# Patient Record
Sex: FEMALE | Race: WHITE | Employment: FULL TIME | URBAN - METROPOLITAN AREA
[De-identification: names, ages, dates, MRNs, and addresses within clinical notes are randomized per-mention and may not be internally consistent; named-entity substitution may affect disease eponyms.]

---

## 2017-07-11 ENCOUNTER — HOSPITAL ENCOUNTER (OUTPATIENT)
Dept: MAMMOGRAPHY | Age: 57
Discharge: HOME | End: 2017-07-11
Attending: INTERNAL MEDICINE
Payer: COMMERCIAL

## 2017-07-11 DIAGNOSIS — Z12.31 VISIT FOR SCREENING MAMMOGRAM: ICD-10-CM

## 2017-07-11 DIAGNOSIS — D47.2 MONOCLONAL GAMMOPATHY: ICD-10-CM

## 2017-07-11 DIAGNOSIS — C90.00 MULTIPLE MYELOMA NOT HAVING ACHIEVED REMISSION (HCC): ICD-10-CM

## 2017-07-11 PROCEDURE — 77067 SCR MAMMO BI INCL CAD: CPT

## 2017-07-11 PROCEDURE — 77080 DXA BONE DENSITY AXIAL: CPT

## 2017-08-14 ENCOUNTER — TELEPHONE (OUTPATIENT)
Dept: SURGERY | Age: 57
End: 2017-08-14

## 2017-08-14 DIAGNOSIS — K21.9 GASTROESOPHAGEAL REFLUX DISEASE, ESOPHAGITIS PRESENCE NOT SPECIFIED: Primary | ICD-10-CM

## 2017-08-14 RX ORDER — SUCRALFATE 1 G/1
1 TABLET ORAL 4 TIMES DAILY
Qty: 120 TAB | Refills: 1 | Status: SHIPPED | OUTPATIENT
Start: 2017-08-14 | End: 2017-08-14 | Stop reason: SDUPTHER

## 2017-08-14 RX ORDER — SUCRALFATE 1 G/1
1 TABLET ORAL 4 TIMES DAILY
Qty: 120 TAB | Refills: 1 | Status: SHIPPED | OUTPATIENT
Start: 2017-08-14 | End: 2018-11-05

## 2017-08-14 NOTE — TELEPHONE ENCOUNTER
Spoke with pharmacists @ [facility] regarding prescription keep printing. she states,usually Carafate does not  Have to be phoned in. Per Graeme Peace NP Carafate was phone into patient's pharmacy on file.

## 2018-03-01 ENCOUNTER — HOSPITAL ENCOUNTER (EMERGENCY)
Age: 58
Discharge: HOME | End: 2018-03-01
Attending: EMERGENCY MEDICINE
Payer: COMMERCIAL

## 2018-03-01 ENCOUNTER — APPOINTMENT (OUTPATIENT)
Dept: GENERAL RADIOLOGY | Age: 58
End: 2018-03-01
Attending: EMERGENCY MEDICINE
Payer: COMMERCIAL

## 2018-03-01 ENCOUNTER — APPOINTMENT (OUTPATIENT)
Dept: CT IMAGING | Age: 58
End: 2018-03-01
Attending: EMERGENCY MEDICINE
Payer: COMMERCIAL

## 2018-03-01 VITALS
DIASTOLIC BLOOD PRESSURE: 70 MMHG | SYSTOLIC BLOOD PRESSURE: 130 MMHG | BODY MASS INDEX: 22.25 KG/M2 | OXYGEN SATURATION: 100 % | WEIGHT: 138.45 LBS | RESPIRATION RATE: 20 BRPM | TEMPERATURE: 98.5 F | HEART RATE: 111 BPM | HEIGHT: 66 IN

## 2018-03-01 DIAGNOSIS — E86.0 DEHYDRATION: ICD-10-CM

## 2018-03-01 DIAGNOSIS — K52.9 GASTROENTERITIS, ACUTE: Primary | ICD-10-CM

## 2018-03-01 LAB
ALBUMIN SERPL-MCNC: 3.6 G/DL (ref 3.5–5)
ALBUMIN/GLOB SERPL: 1.1 {RATIO} (ref 1.1–2.2)
ALP SERPL-CCNC: 42 U/L (ref 45–117)
ALT SERPL-CCNC: 12 U/L (ref 12–78)
ANION GAP SERPL CALC-SCNC: 8 MMOL/L (ref 5–15)
APPEARANCE UR: CLEAR
AST SERPL-CCNC: 15 U/L (ref 15–37)
BACTERIA URNS QL MICRO: NEGATIVE /HPF
BASOPHILS # BLD: 0 K/UL (ref 0–0.1)
BASOPHILS NFR BLD: 0 % (ref 0–1)
BILIRUB SERPL-MCNC: 0.5 MG/DL (ref 0.2–1)
BILIRUB UR QL: NEGATIVE
BUN SERPL-MCNC: 30 MG/DL (ref 6–20)
BUN/CREAT SERPL: 21 (ref 12–20)
CALCIUM SERPL-MCNC: 7.7 MG/DL (ref 8.5–10.1)
CHLORIDE SERPL-SCNC: 116 MMOL/L (ref 97–108)
CO2 SERPL-SCNC: 19 MMOL/L (ref 21–32)
COLOR UR: ABNORMAL
CREAT SERPL-MCNC: 1.44 MG/DL (ref 0.55–1.02)
DIFFERENTIAL METHOD BLD: ABNORMAL
EOSINOPHIL # BLD: 0 K/UL (ref 0–0.4)
EOSINOPHIL NFR BLD: 1 % (ref 0–7)
EPITH CASTS URNS QL MICRO: ABNORMAL /LPF
ERYTHROCYTE [DISTWIDTH] IN BLOOD BY AUTOMATED COUNT: 14.5 % (ref 11.5–14.5)
GLOBULIN SER CALC-MCNC: 3.3 G/DL (ref 2–4)
GLUCOSE SERPL-MCNC: 94 MG/DL (ref 65–100)
GLUCOSE UR STRIP.AUTO-MCNC: NEGATIVE MG/DL
HCT VFR BLD AUTO: 39.2 % (ref 35–47)
HGB BLD-MCNC: 12.9 G/DL (ref 11.5–16)
HGB UR QL STRIP: ABNORMAL
HYALINE CASTS URNS QL MICRO: ABNORMAL /LPF (ref 0–5)
IMM GRANULOCYTES # BLD: 0 K/UL (ref 0–0.04)
IMM GRANULOCYTES NFR BLD AUTO: 0 % (ref 0–0.5)
KETONES UR QL STRIP.AUTO: ABNORMAL MG/DL
LACTATE SERPL-SCNC: 0.6 MMOL/L (ref 0.4–2)
LEUKOCYTE ESTERASE UR QL STRIP.AUTO: NEGATIVE
LIPASE SERPL-CCNC: 124 U/L (ref 73–393)
LYMPHOCYTES # BLD: 0.6 K/UL (ref 0.8–3.5)
LYMPHOCYTES NFR BLD: 18 % (ref 12–49)
MCH RBC QN AUTO: 30.4 PG (ref 26–34)
MCHC RBC AUTO-ENTMCNC: 32.9 G/DL (ref 30–36.5)
MCV RBC AUTO: 92.2 FL (ref 80–99)
MONOCYTES # BLD: 0.4 K/UL (ref 0–1)
MONOCYTES NFR BLD: 12 % (ref 5–13)
NEUTS SEG # BLD: 2.5 K/UL (ref 1.8–8)
NEUTS SEG NFR BLD: 69 % (ref 32–75)
NITRITE UR QL STRIP.AUTO: NEGATIVE
NRBC # BLD: 0 K/UL (ref 0–0.01)
NRBC BLD-RTO: 0 PER 100 WBC
PH UR STRIP: 5.5 [PH] (ref 5–8)
PLATELET # BLD AUTO: 102 K/UL (ref 150–400)
PMV BLD AUTO: 8.7 FL (ref 8.9–12.9)
POTASSIUM SERPL-SCNC: 3.6 MMOL/L (ref 3.5–5.1)
PROT SERPL-MCNC: 6.9 G/DL (ref 6.4–8.2)
PROT UR STRIP-MCNC: NEGATIVE MG/DL
RBC # BLD AUTO: 4.25 M/UL (ref 3.8–5.2)
RBC #/AREA URNS HPF: ABNORMAL /HPF (ref 0–5)
RBC MORPH BLD: ABNORMAL
SODIUM SERPL-SCNC: 143 MMOL/L (ref 136–145)
SP GR UR REFRACTOMETRY: 1.01 (ref 1–1.03)
UROBILINOGEN UR QL STRIP.AUTO: 0.2 EU/DL (ref 0.2–1)
WBC # BLD AUTO: 3.5 K/UL (ref 3.6–11)
WBC URNS QL MICRO: ABNORMAL /HPF (ref 0–4)

## 2018-03-01 PROCEDURE — 74176 CT ABD & PELVIS W/O CONTRAST: CPT

## 2018-03-01 PROCEDURE — 99284 EMERGENCY DEPT VISIT MOD MDM: CPT

## 2018-03-01 PROCEDURE — 87040 BLOOD CULTURE FOR BACTERIA: CPT | Performed by: EMERGENCY MEDICINE

## 2018-03-01 PROCEDURE — 83605 ASSAY OF LACTIC ACID: CPT | Performed by: EMERGENCY MEDICINE

## 2018-03-01 PROCEDURE — 83690 ASSAY OF LIPASE: CPT | Performed by: EMERGENCY MEDICINE

## 2018-03-01 PROCEDURE — 96361 HYDRATE IV INFUSION ADD-ON: CPT

## 2018-03-01 PROCEDURE — 85025 COMPLETE CBC W/AUTO DIFF WBC: CPT | Performed by: EMERGENCY MEDICINE

## 2018-03-01 PROCEDURE — 71045 X-RAY EXAM CHEST 1 VIEW: CPT

## 2018-03-01 PROCEDURE — 36415 COLL VENOUS BLD VENIPUNCTURE: CPT | Performed by: EMERGENCY MEDICINE

## 2018-03-01 PROCEDURE — 80053 COMPREHEN METABOLIC PANEL: CPT | Performed by: EMERGENCY MEDICINE

## 2018-03-01 PROCEDURE — 74011250636 HC RX REV CODE- 250/636: Performed by: EMERGENCY MEDICINE

## 2018-03-01 PROCEDURE — 81001 URINALYSIS AUTO W/SCOPE: CPT | Performed by: EMERGENCY MEDICINE

## 2018-03-01 PROCEDURE — 96374 THER/PROPH/DIAG INJ IV PUSH: CPT

## 2018-03-01 RX ORDER — SODIUM CHLORIDE 0.9 % (FLUSH) 0.9 %
5-10 SYRINGE (ML) INJECTION AS NEEDED
Status: DISCONTINUED | OUTPATIENT
Start: 2018-03-01 | End: 2018-03-01 | Stop reason: HOSPADM

## 2018-03-01 RX ORDER — HYDROCODONE BITARTRATE AND ACETAMINOPHEN 5; 325 MG/1; MG/1
2 TABLET ORAL
Qty: 10 TAB | Refills: 0 | Status: SHIPPED | OUTPATIENT
Start: 2018-03-01 | End: 2018-11-05

## 2018-03-01 RX ORDER — ONDANSETRON 4 MG/1
4 TABLET, ORALLY DISINTEGRATING ORAL
Qty: 20 TAB | Refills: 0 | Status: SHIPPED | OUTPATIENT
Start: 2018-03-01 | End: 2018-11-05

## 2018-03-01 RX ORDER — ONDANSETRON 2 MG/ML
8 INJECTION INTRAMUSCULAR; INTRAVENOUS
Status: COMPLETED | OUTPATIENT
Start: 2018-03-01 | End: 2018-03-01

## 2018-03-01 RX ADMIN — SODIUM CHLORIDE 1884 ML: 900 INJECTION, SOLUTION INTRAVENOUS at 15:27

## 2018-03-01 RX ADMIN — ONDANSETRON 8 MG: 2 INJECTION INTRAMUSCULAR; INTRAVENOUS at 15:27

## 2018-03-01 NOTE — DISCHARGE INSTRUCTIONS
Dehydration: Care Instructions  Your Care Instructions  Dehydration happens when your body loses too much fluid. This might happen when you do not drink enough water or you lose large amounts of fluids from your body because of diarrhea, vomiting, or sweating. Severe dehydration can be life-threatening. Water and minerals called electrolytes help put your body fluids back in balance. Learn the early signs of fluid loss, and drink more fluids to prevent dehydration. Follow-up care is a key part of your treatment and safety. Be sure to make and go to all appointments, and call your doctor if you are having problems. It's also a good idea to know your test results and keep a list of the medicines you take. How can you care for yourself at home? · To prevent dehydration, drink plenty of fluids, enough so that your urine is light yellow or clear like water. Choose water and other caffeine-free clear liquids until you feel better. If you have kidney, heart, or liver disease and have to limit fluids, talk with your doctor before you increase the amount of fluids you drink. · If you do not feel like eating or drinking, try taking small sips of water, sports drinks, or other rehydration drinks. · Get plenty of rest.  To prevent dehydration  · Add more fluids to your diet and daily routine, unless your doctor has told you not to. · During hot weather, drink more fluids. Drink even more fluids if you exercise a lot. Stay away from drinks with alcohol or caffeine. · Watch for the symptoms of dehydration. These include:  ¨ A dry, sticky mouth. ¨ Dark yellow urine, and not much of it. ¨ Dry and sunken eyes. ¨ Feeling very tired. · Learn what problems can lead to dehydration. These include:  ¨ Diarrhea, fever, and vomiting. ¨ Any illness with a fever, such as pneumonia or the flu. ¨ Activities that cause heavy sweating, such as endurance races and heavy outdoor work in hot or humid weather.   ¨ Alcohol or drug abuse or withdrawal.  ¨ Certain medicines, such as cold and allergy pills (antihistamines), diet pills (diuretics), and laxatives. ¨ Certain diseases, such as diabetes, cancer, and heart or kidney disease. When should you call for help? Call 911 anytime you think you may need emergency care. For example, call if:  ? · You passed out (lost consciousness). ?Call your doctor now or seek immediate medical care if:  ? · You are confused and cannot think clearly. ? · You are dizzy or lightheaded, or you feel like you may faint. ? · You have signs of needing more fluids. You have sunken eyes and a dry mouth, and you pass only a little dark urine. ? · You cannot keep fluids down. ? Watch closely for changes in your health, and be sure to contact your doctor if:  ? · You are not making tears. ? · Your skin is very dry and sags slowly back into place after you pinch it. ? · Your mouth and eyes are very dry. Where can you learn more? Go to http://susan-edis.info/. Enter R780 in the search box to learn more about \"Dehydration: Care Instructions. \"  Current as of: March 20, 2017  Content Version: 11.4  © 4736-2957 [employer]. Care instructions adapted under license by [employer] (which disclaims liability or warranty for this information). If you have questions about a medical condition or this instruction, always ask your healthcare professional. [place] [address] any warranty or liability for your use of this information.

## 2018-03-01 NOTE — ED PROVIDER NOTES
EMERGENCY DEPARTMENT HISTORY AND PHYSICAL EXAM      Date: 3/1/2018  Patient Name: Torri Howard    History of Presenting Illness     Chief Complaint   Patient presents with    Abdominal Pain     lower abd pain with n/v/d and low grade fever onset monday       History Provided By: Patient    HPI: Torri Howard, 59 y.o. female with PMHx significant for CAD, CKD, gastric bypass, hysterectomy, HTN, and cancer, presents ambulatory to the ED with cc of moderate, and cramping lower/left sided abdominal pain, along with associated N/V/D, and 101F fever x 3 days. Pt describes having watery diarrhea. She reports being a , and being around a sick child who went home for diarrhea on 02/23/2018. She denies being on recent antibiotics, and any recent hospital stays. Social Hx:  ETOH: no  Tobacco: no  Illicit drug use: no       PCP: Delphine Schmitz MD    There are no other complaints, changes, or physical findings at this time. Current Facility-Administered Medications   Medication Dose Route Frequency Provider Last Rate Last Dose    sodium chloride (NS) flush 5-10 mL  5-10 mL IntraVENous PRN Migdalia Venegas MD         Current Outpatient Prescriptions   Medication Sig Dispense Refill    ondansetron (ZOFRAN ODT) 4 mg disintegrating tablet Take 1 Tab by mouth every eight (8) hours as needed for Nausea. 20 Tab 0    HYDROcodone-acetaminophen (NORCO) 5-325 mg per tablet Take 2 Tabs by mouth every four (4) hours as needed. 10 Tab 0    naloxone 2 mg/actuation spry Use 1 spray intranasally into 1 nostril. Use a new Narcan nasal spray for subsequent doses and administer into alternating nostrils. May repeat every 2 to 3 minutes as needed. 4 Each 0    sucralfate (CARAFATE) 1 gram tablet Take 1 Tab by mouth four (4) times daily. May dissolve in 15 ml of water 120 Tab 1    saccharomyces boulardii (FLORASTOR) 250 mg capsule Take 1 capsule by mouth two (2) times a day.  60 capsule 0    lisinopril-hydrochlorothiazide (PRINZIDE, ZESTORETIC) 20-12.5 mg per tablet Take 1 Tab by mouth daily.  COD LIVER OIL PO Take  by mouth daily.  ascorbic acid (VITAMIN C) 500 mg tablet Take 500 mg by mouth daily.  VAGIFEM 10 mcg Tab vaginal tablet 10 mcg every other day.  LORazepam (ATIVAN) 1 mg tablet Take 1 Tab by mouth nightly as needed. 30 Tab 0    CYANOCOBALAMIN, VITAMIN B-12, (VITAMIN B-12 PO) Take  by mouth.  multivitamins with iron Tab Take 18 mg by mouth two (2) times a day. 18mg of iron 90 Tab 5       Past History     Past Medical History:  Past Medical History:   Diagnosis Date    Cancer ([facility] [address]     multiple Myolomia    Chronic kidney disease     2010-dr tillman    Dyspepsia 4/11/07    Edema of both legs 4/11/07    FH: CAD (coronary artery disease)     pt denies cad    [facility] 2007    [facility]    Gastrointestinal disorder     gastric bypass , and unclers    Headache(784.0) 4/11/07    HTN (hypertension) 4/11/07    Joint pain 4/11/07    Morbid obesity ([address] 4/11/07    Multiple myeloma     dr Bradford Retort- diagnosed 2010    Multiple myeloma, without mention of having achieved remission 11/4/2011    Perforated viscus 7/18/2014    S/P gastric bypass 11/4/2011    Sleep apnea 4/11/07    Stool color black        Past Surgical History:  Past Surgical History:   Procedure Laterality Date    HX GASTRIC BYPASS  7/9/07    GASTRIC BYPASS--MONTOYA    HX GYN  2001    hysterectomy    HX TOTAL ABDOMINAL HYSTERECTOMY  2002       Family History:  Family History   Problem Relation Age of Onset    Hypertension Mother     Diabetes Father     Heart Disease Father     Stroke Father     Breast Cancer Cousin 41     bilateral mastectomy       Social History:  Social History   Substance Use Topics    Smoking status: Never Smoker    Smokeless tobacco: Never Used    Alcohol use No       Allergies:   Allergies   Allergen Reactions    Bactrim [Sulfamethoprim Ds] Nausea and Vomiting    Bactrim [Sulfamethoprim] Nausea and Vomiting         Review of Systems   Review of Systems   Constitutional: Positive for fever. Negative for activity change and chills. HENT: Negative for congestion and sore throat. Eyes: Negative for pain and redness. Respiratory: Negative for cough, chest tightness and shortness of breath. Cardiovascular: Negative for chest pain and palpitations. Gastrointestinal: Positive for abdominal pain, diarrhea, nausea and vomiting. Genitourinary: Negative for dysuria, frequency and urgency. Musculoskeletal: Negative for back pain and neck pain. Skin: Negative for rash. Neurological: Negative for syncope, light-headedness and headaches. Psychiatric/Behavioral: Negative for confusion. All other systems reviewed and are negative. Physical Exam   Physical Exam   Constitutional: She is oriented to person, place, and time. She appears well-developed and well-nourished. No distress. HENT:   Head: Normocephalic. Nose: Nose normal.   Mouth/Throat: Oropharynx is clear and moist. No oropharyngeal exudate. Eyes: Conjunctivae are normal. Pupils are equal, round, and reactive to light. No scleral icterus. Neck: Normal range of motion. Neck supple. No JVD present. No tracheal deviation present. No thyromegaly present. Cardiovascular: Normal rate, regular rhythm and intact distal pulses. Exam reveals no gallop and no friction rub. No murmur heard. Pulmonary/Chest: Effort normal and breath sounds normal. No stridor. No respiratory distress. She has no wheezes. She has no rales. Abdominal: Soft. Bowel sounds are normal. She exhibits no distension. There is tenderness (mild) in the left lower quadrant. There is no rebound and no guarding. Musculoskeletal: Normal range of motion. She exhibits no edema. Lymphadenopathy:     She has no cervical adenopathy.    Neurological: She is alert and oriented to person, place, and time. No cranial nerve deficit. She exhibits normal muscle tone. Coordination normal.   Skin: Skin is warm and dry. No rash noted. She is not diaphoretic. No erythema. Psychiatric: She has a normal mood and affect. Her behavior is normal.   Nursing note and vitals reviewed. Diagnostic Study Results     Labs -     Recent Results (from the past 12 hour(s))   CBC WITH AUTOMATED DIFF    Collection Time: 03/01/18  3:13 PM   Result Value Ref Range    WBC 3.5 (L) 3.6 - 11.0 K/uL    RBC 4.25 3.80 - 5.20 M/uL    HGB 12.9 11.5 - 16.0 g/dL    HCT 39.2 35.0 - 47.0 %    MCV 92.2 80.0 - 99.0 FL    MCH 30.4 26.0 - 34.0 PG    MCHC 32.9 30.0 - 36.5 g/dL    RDW 14.5 11.5 - 14.5 %    PLATELET 962 (L) 536 - 400 K/uL    MPV 8.7 (L) 8.9 - 12.9 FL    NRBC 0.0 0  WBC    ABSOLUTE NRBC 0.00 0.00 - 0.01 K/uL    NEUTROPHILS 69 32 - 75 %    LYMPHOCYTES 18 12 - 49 %    MONOCYTES 12 5 - 13 %    EOSINOPHILS 1 0 - 7 %    BASOPHILS 0 0 - 1 %    IMMATURE GRANULOCYTES 0 0.0 - 0.5 %    ABS. NEUTROPHILS 2.5 1.8 - 8.0 K/UL    ABS. LYMPHOCYTES 0.6 (L) 0.8 - 3.5 K/UL    ABS. MONOCYTES 0.4 0.0 - 1.0 K/UL    ABS. EOSINOPHILS 0.0 0.0 - 0.4 K/UL    ABS. BASOPHILS 0.0 0.0 - 0.1 K/UL    ABS. IMM. GRANS. 0.0 0.00 - 0.04 K/UL    DF SMEAR SCANNED      RBC COMMENTS NORMOCYTIC, NORMOCHROMIC     METABOLIC PANEL, COMPREHENSIVE    Collection Time: 03/01/18  3:13 PM   Result Value Ref Range    Sodium 143 136 - 145 mmol/L    Potassium 3.6 3.5 - 5.1 mmol/L    Chloride 116 (H) 97 - 108 mmol/L    CO2 19 (L) 21 - 32 mmol/L    Anion gap 8 5 - 15 mmol/L    Glucose 94 65 - 100 mg/dL    BUN 30 (H) 6 - 20 MG/DL    Creatinine 1.44 (H) 0.55 - 1.02 MG/DL    BUN/Creatinine ratio 21 (H) 12 - 20      GFR est AA 45 (L) >60 ml/min/1.73m2    GFR est non-AA 37 (L) >60 ml/min/1.73m2    Calcium 7.7 (L) 8.5 - 10.1 MG/DL    Bilirubin, total 0.5 0.2 - 1.0 MG/DL    ALT (SGPT) 12 12 - 78 U/L    AST (SGOT) 15 15 - 37 U/L    Alk.  phosphatase 42 (L) 45 - 117 U/L    Protein, total 6.9 6.4 - 8.2 g/dL    Albumin 3.6 3.5 - 5.0 g/dL    Globulin 3.3 2.0 - 4.0 g/dL    A-G Ratio 1.1 1.1 - 2.2     LACTIC ACID    Collection Time: 03/01/18  3:13 PM   Result Value Ref Range    Lactic acid 0.6 0.4 - 2.0 MMOL/L   LIPASE    Collection Time: 03/01/18  3:13 PM   Result Value Ref Range    Lipase 124 73 - 393 U/L   URINALYSIS W/ RFLX MICROSCOPIC    Collection Time: 03/01/18  5:31 PM   Result Value Ref Range    Color YELLOW/STRAW      Appearance CLEAR CLEAR      Specific gravity 1.010 1.003 - 1.030      pH (UA) 5.5 5.0 - 8.0      Protein NEGATIVE  NEG mg/dL    Glucose NEGATIVE  NEG mg/dL    Ketone TRACE (A) NEG mg/dL    Bilirubin NEGATIVE  NEG      Blood SMALL (A) NEG      Urobilinogen 0.2 0.2 - 1.0 EU/dL    Nitrites NEGATIVE  NEG      Leukocyte Esterase NEGATIVE  NEG      WBC 0-4 0 - 4 /hpf    RBC 5-10 0 - 5 /hpf    Epithelial cells FEW FEW /lpf    Bacteria NEGATIVE  NEG /hpf    Hyaline cast 0-2 0 - 5 /lpf       Radiologic Studies -   CT Results  (Last 48 hours)               03/01/18 1709  CT ABD PELV WO CONT Final result    Impression:  IMPRESSION: No evidence of acute diverticulitis or other acute intra-abdominal   process. Incidental findings as above including cholelithiasis. Narrative:  EXAM:  CT ABD PELV WO CONT       INDICATION: Diverticulitis       COMPARISON: CT 7/18/2014. CONTRAST:  None. TECHNIQUE:    Thin axial images were obtained through the abdomen and pelvis. Coronal and   sagittal reconstructions were generated. Oral contrast was not administered. CT   dose reduction was achieved through use of a standardized protocol tailored for   this examination and automatic exposure control for dose modulation. The absence of intravenous contrast material reduces the sensitivity for   evaluation of the solid parenchymal organs of the abdomen. FINDINGS:    LUNG BASES: Clear. INCIDENTALLY IMAGED HEART AND MEDIASTINUM: Unremarkable.    LIVER: No mass or biliary dilatation. GALLBLADDER: Dependent gallstones in the fundus. Otherwise unremarkable. SPLEEN: No mass. PANCREAS: No mass or ductal dilatation. ADRENALS: Stable benign myelolipoma in the left adrenal body. Otherwise   unremarkable. KIDNEYS/URETERS: Bilateral collecting system calculi and cortical calcifications   redemonstrated with no hydronephrosis. Fat-containing lateral interpolar left   renal mass compatible with benign angiomyolipoma is stable. No masses otherwise. Ureters are nondilated. STOMACH: Post surgical changes of gastric bypass appears stable and as expected. SMALL BOWEL: No dilatation or wall thickening. COLON: Noninflamed appearing sigmoid diverticula. No evident wall thickening or   dilation. APPENDIX: Unremarkable. PERITONEUM: No ascites or pneumoperitoneum. RETROPERITONEUM: No lymphadenopathy or aortic aneurysm. REPRODUCTIVE ORGANS: Uterus and ovaries are surgically absent. URINARY BLADDER: No mass or calculus. BONES: Degenerative spine change. Large lucency in the right iliac wing with   central fat density is unchanged. No aggressive lesion or acute fracture. ADDITIONAL COMMENTS: N/A               CXR Results  (Last 48 hours)               03/01/18 1614  XR CHEST PORT Final result    Impression:  IMPRESSION: No acute cardiopulmonary process seen. Narrative:  EXAM:  XR CHEST PORT       INDICATION:  Fever, lower abdominal pain, nausea, vomiting, and diarrhea. Onset   on Monday. Sepsis       COMPARISON:  12/30/2014       FINDINGS: A portable AP radiograph of the chest was obtained at 1559 hours. The   lungs are clear. The cardiac and mediastinal contours and pulmonary vascularity   are remarkable for tortuosity of the descending aorta. The bones and soft   tissues are grossly within normal limits. Medical Decision Making   I am the first provider for this patient.     I reviewed the vital signs, available nursing notes, past medical history, past surgical history, family history and social history. Vital Signs-Reviewed the patient's vital signs. Patient Vitals for the past 12 hrs:   Temp Pulse Resp BP SpO2   03/01/18 1845 - - - 130/70 100 %   03/01/18 1734 - - - 131/89 98 %   03/01/18 1604 - - - - 100 %   03/01/18 1515 - - - 122/82 96 %   03/01/18 1447 98.5 °F (36.9 °C) (!) 111 20 (!) 144/96 97 %       Provider Notes (Medical Decision Making):     DDx: gastroenteritis, dehydration, SBO, diverticulitis. ED Course:   Initial assessment performed. The patients presenting problems have been discussed, and they are in agreement with the care plan formulated and outlined with them. I have encouraged them to ask questions as they arise throughout their visit. ED EKG interpretation: 14:57  Rhythm: normal sinus rhythm; and regular . Rate (approx.): 89; Axis: normal; [place] Interval: normal; QRS interval: normal ; ST/T wave: non-specific changes; This EKG was interpreted by Aidan El MD,ED Provider. 6:47 PM  Pt is feeling much better. She tolerated PO liquids without difficulty. Written by Carletha Kussmaul ED scribe, as dictated by Aidan El MD      Disposition:  DISCHARGE NOTE  7:02 PM  The patient has been re-evaluated and is ready for discharge. Reviewed available results with patient. Counseled pt on diagnosis and care plan. Pt has expressed understanding, and all questions have been answered. Pt agrees with plan and agrees to F/U as recommended, or return to the ED if their sxs worsen. Discharge instructions have been provided and explained to the pt, along with reasons to return to the ED. Pt well appearing afebrile; abd soft; ct abd pelvis without acute process; vss; pt tolerating po prior to discharge; suspect viral gastroenteritis; normal lipase; normal lactate; clear cxr; Aidan El MD      PLAN:  1.    Discharge Medication List as of 3/1/2018  6:52 PM      START taking these medications    Details   ondansetron (ZOFRAN ODT) 4 mg disintegrating tablet Take 1 Tab by mouth every eight (8) hours as needed for Nausea. , Print, Disp-20 Tab, R-0      naloxone 2 mg/actuation spry Use 1 spray intranasally into 1 nostril. Use a new Narcan nasal spray for subsequent doses and administer into alternating nostrils. May repeat every 2 to 3 minutes as needed. , Print, Disp-4 Each, R-0         CONTINUE these medications which have CHANGED    Details   HYDROcodone-acetaminophen (NORCO) 5-325 mg per tablet Take 2 Tabs by mouth every four (4) hours as needed. , Print, Disp-10 Tab, R-0         CONTINUE these medications which have NOT CHANGED    Details   sucralfate (CARAFATE) 1 gram tablet Take 1 Tab by mouth four (4) times daily. May dissolve in 15 ml of water, Print, Disp-120 Tab, R-1      saccharomyces boulardii (FLORASTOR) 250 mg capsule Take 1 capsule by mouth two (2) times a day., Normal, Disp-60 capsule, R-0      lisinopril-hydrochlorothiazide (PRINZIDE, ZESTORETIC) 20-12.5 mg per tablet Take 1 Tab by mouth daily. , Historical Med      COD LIVER OIL PO Take  by mouth daily. Historical Med      ascorbic acid (VITAMIN C) 500 mg tablet Take 500 mg by mouth daily. Historical Med, 500 mg      VAGIFEM 10 mcg Tab vaginal tablet 10 mcg every other day. Historical Med, 10 mcg      LORazepam (ATIVAN) 1 mg tablet Take 1 Tab by mouth nightly as needed. Print, 1 mg, Disp-30 Tab, R-0      CYANOCOBALAMIN, VITAMIN B-12, (VITAMIN B-12 PO) Take  by mouth. Historical Med      multivitamins with iron Tab 18mg of iron18 mg, Oral, 2 TIMES DAILY Starting 9/3/2010, Until Discontinued, Disp-90 Tab, R-5, No Print           2. Follow-up Information     Follow up With Details Comments Contact Info    Meghna Cantu MD Schedule an appointment as soon as possible for a visit in 1 week  [address] [place] [address]  [phone]          Return to ED if worse     Diagnosis      Clinical Impression:   1. Gastroenteritis, acute    2. Dehydration        Attestations: This note is prepared by Nellie Olivares, acting as Scribe for Avinash Gresham MD.      The scribe's documentation has been prepared under my direction and personally reviewed by me in its entirety. I confirm that the note above accurately reflects all work, treatment, procedures, and medical decision making performed by me.   Avinash Gresham MD

## 2018-03-02 NOTE — ED NOTES
Discharge instructions given to patient by Dr. Mari Armstrong. Patient verbalized understanding of discharge instructions. Pt discharged without difficulty. Pt. Discharged in stable condition via ambulation , accompanied by .

## 2018-03-06 LAB
BACTERIA SPEC CULT: NORMAL
SERVICE CMNT-IMP: NORMAL

## 2018-07-19 ENCOUNTER — HOSPITAL ENCOUNTER (OUTPATIENT)
Dept: PET IMAGING | Age: 58
Discharge: HOME | End: 2018-07-19
Attending: INTERNAL MEDICINE
Payer: COMMERCIAL

## 2018-07-19 VITALS — WEIGHT: 141 LBS | BODY MASS INDEX: 22.66 KG/M2 | HEIGHT: 66 IN

## 2018-07-19 DIAGNOSIS — D47.2 MONOCLONAL GAMMOPATHY: ICD-10-CM

## 2018-07-19 DIAGNOSIS — C90.00 MULTIPLE MYELOMA NOT HAVING ACHIEVED REMISSION (HCC): ICD-10-CM

## 2018-07-19 DIAGNOSIS — M81.0 AGE-RELATED OSTEOPOROSIS WITHOUT CURRENT PATHOLOGICAL FRACTURE: ICD-10-CM

## 2018-07-19 PROCEDURE — A9552 F18 FDG: HCPCS

## 2018-07-19 RX ORDER — SODIUM CHLORIDE 0.9 % (FLUSH) 0.9 %
10 SYRINGE (ML) INJECTION
Status: COMPLETED | OUTPATIENT
Start: 2018-07-19 | End: 2018-07-19

## 2018-07-19 RX ADMIN — Medication 10 ML: at 09:03

## 2018-11-05 ENCOUNTER — APPOINTMENT (OUTPATIENT)
Dept: GENERAL RADIOLOGY | Age: 58
DRG: 871 | End: 2018-11-05
Attending: EMERGENCY MEDICINE
Payer: COMMERCIAL

## 2018-11-05 ENCOUNTER — HOSPITAL ENCOUNTER (INPATIENT)
Age: 58
LOS: 3 days | Discharge: HOME | DRG: 871 | End: 2018-11-08
Attending: EMERGENCY MEDICINE | Admitting: INTERNAL MEDICINE
Payer: COMMERCIAL

## 2018-11-05 DIAGNOSIS — J18.9 COMMUNITY ACQUIRED PNEUMONIA OF RIGHT LOWER LOBE OF LUNG: ICD-10-CM

## 2018-11-05 DIAGNOSIS — A41.9 SEPSIS, DUE TO UNSPECIFIED ORGANISM: Primary | ICD-10-CM

## 2018-11-05 PROBLEM — I10 HTN (HYPERTENSION): Status: ACTIVE | Noted: 2018-11-05

## 2018-11-05 LAB
ALBUMIN SERPL-MCNC: 3.1 G/DL (ref 3.5–5)
ALBUMIN/GLOB SERPL: 0.7 {RATIO} (ref 1.1–2.2)
ALP SERPL-CCNC: 52 U/L (ref 45–117)
ALT SERPL-CCNC: 16 U/L (ref 12–78)
ANION GAP SERPL CALC-SCNC: 11 MMOL/L (ref 5–15)
APPEARANCE UR: CLEAR
AST SERPL-CCNC: 24 U/L (ref 15–37)
ATRIAL RATE: 116 BPM
BACTERIA URNS QL MICRO: ABNORMAL /HPF
BASOPHILS # BLD: 0 K/UL (ref 0–0.1)
BASOPHILS NFR BLD: 0 % (ref 0–1)
BILIRUB SERPL-MCNC: 0.8 MG/DL (ref 0.2–1)
BILIRUB UR QL CFM: POSITIVE
BUN SERPL-MCNC: 34 MG/DL (ref 6–20)
BUN/CREAT SERPL: 21 (ref 12–20)
CALCIUM SERPL-MCNC: 8.4 MG/DL (ref 8.5–10.1)
CALCULATED P AXIS, ECG09: 68 DEGREES
CALCULATED R AXIS, ECG10: -5 DEGREES
CALCULATED T AXIS, ECG11: 63 DEGREES
CHLORIDE SERPL-SCNC: 105 MMOL/L (ref 97–108)
CK SERPL-CCNC: 72 U/L (ref 26–192)
CO2 SERPL-SCNC: 20 MMOL/L (ref 21–32)
COLOR UR: ABNORMAL
CREAT SERPL-MCNC: 1.62 MG/DL (ref 0.55–1.02)
DIAGNOSIS, 93000: NORMAL
DIFFERENTIAL METHOD BLD: ABNORMAL
EOSINOPHIL # BLD: 0 K/UL (ref 0–0.4)
EOSINOPHIL NFR BLD: 0 % (ref 0–7)
EPITH CASTS URNS QL MICRO: ABNORMAL /LPF
ERYTHROCYTE [DISTWIDTH] IN BLOOD BY AUTOMATED COUNT: 13.5 % (ref 11.5–14.5)
GLOBULIN SER CALC-MCNC: 4.4 G/DL (ref 2–4)
GLUCOSE SERPL-MCNC: 107 MG/DL (ref 65–100)
GLUCOSE UR STRIP.AUTO-MCNC: NEGATIVE MG/DL
HCT VFR BLD AUTO: 38.2 % (ref 35–47)
HGB BLD-MCNC: 12.3 G/DL (ref 11.5–16)
HGB UR QL STRIP: ABNORMAL
IMM GRANULOCYTES # BLD: 0 K/UL (ref 0–0.04)
IMM GRANULOCYTES NFR BLD AUTO: 0 % (ref 0–0.5)
KETONES UR QL STRIP.AUTO: 15 MG/DL
LACTATE BLD-SCNC: 1.24 MMOL/L (ref 0.4–2)
LEUKOCYTE ESTERASE UR QL STRIP.AUTO: ABNORMAL
LYMPHOCYTES # BLD: 0.5 K/UL (ref 0.8–3.5)
LYMPHOCYTES NFR BLD: 7 % (ref 12–49)
MCH RBC QN AUTO: 30.3 PG (ref 26–34)
MCHC RBC AUTO-ENTMCNC: 32.2 G/DL (ref 30–36.5)
MCV RBC AUTO: 94.1 FL (ref 80–99)
MONOCYTES # BLD: 0.3 K/UL (ref 0–1)
MONOCYTES NFR BLD: 4 % (ref 5–13)
NEUTS BAND NFR BLD MANUAL: 14 %
NEUTS SEG # BLD: 5.9 K/UL (ref 1.8–8)
NEUTS SEG NFR BLD: 75 % (ref 32–75)
NITRITE UR QL STRIP.AUTO: POSITIVE
NRBC # BLD: 0 K/UL (ref 0–0.01)
NRBC BLD-RTO: 0 PER 100 WBC
OTHER,OTHU: ABNORMAL
P-R INTERVAL, ECG05: 148 MS
PH UR STRIP: 6 [PH] (ref 5–8)
PLATELET # BLD AUTO: 107 K/UL (ref 150–400)
PMV BLD AUTO: 9 FL (ref 8.9–12.9)
POTASSIUM SERPL-SCNC: 3.7 MMOL/L (ref 3.5–5.1)
PROT SERPL-MCNC: 7.5 G/DL (ref 6.4–8.2)
PROT UR STRIP-MCNC: 300 MG/DL
Q-T INTERVAL, ECG07: 324 MS
QRS DURATION, ECG06: 72 MS
QTC CALCULATION (BEZET), ECG08: 450 MS
RBC # BLD AUTO: 4.06 M/UL (ref 3.8–5.2)
RBC #/AREA URNS HPF: ABNORMAL /HPF (ref 0–5)
RBC MORPH BLD: ABNORMAL
SODIUM SERPL-SCNC: 136 MMOL/L (ref 136–145)
SP GR UR REFRACTOMETRY: 1.02 (ref 1–1.03)
TROPONIN I SERPL-MCNC: <0.05 NG/ML
UA: UC IF INDICATED,UAUC: ABNORMAL
UROBILINOGEN UR QL STRIP.AUTO: 1 EU/DL (ref 0.2–1)
VENTRICULAR RATE, ECG03: 116 BPM
WBC # BLD AUTO: 6.7 K/UL (ref 3.6–11)
WBC URNS QL MICRO: ABNORMAL /HPF (ref 0–4)
YEAST URNS QL MICRO: PRESENT

## 2018-11-05 PROCEDURE — 71046 X-RAY EXAM CHEST 2 VIEWS: CPT

## 2018-11-05 PROCEDURE — 85025 COMPLETE CBC W/AUTO DIFF WBC: CPT | Performed by: EMERGENCY MEDICINE

## 2018-11-05 PROCEDURE — 74011000258 HC RX REV CODE- 258: Performed by: EMERGENCY MEDICINE

## 2018-11-05 PROCEDURE — 87186 SC STD MICRODIL/AGAR DIL: CPT | Performed by: EMERGENCY MEDICINE

## 2018-11-05 PROCEDURE — 87086 URINE CULTURE/COLONY COUNT: CPT | Performed by: EMERGENCY MEDICINE

## 2018-11-05 PROCEDURE — 74011250637 HC RX REV CODE- 250/637: Performed by: INTERNAL MEDICINE

## 2018-11-05 PROCEDURE — 82550 ASSAY OF CK (CPK): CPT | Performed by: EMERGENCY MEDICINE

## 2018-11-05 PROCEDURE — 96365 THER/PROPH/DIAG IV INF INIT: CPT

## 2018-11-05 PROCEDURE — 93005 ELECTROCARDIOGRAM TRACING: CPT

## 2018-11-05 PROCEDURE — 36415 COLL VENOUS BLD VENIPUNCTURE: CPT | Performed by: EMERGENCY MEDICINE

## 2018-11-05 PROCEDURE — 83605 ASSAY OF LACTIC ACID: CPT

## 2018-11-05 PROCEDURE — 84484 ASSAY OF TROPONIN QUANT: CPT | Performed by: EMERGENCY MEDICINE

## 2018-11-05 PROCEDURE — 74011250636 HC RX REV CODE- 250/636: Performed by: INTERNAL MEDICINE

## 2018-11-05 PROCEDURE — 77030027138 HC INCENT SPIROMETER -A

## 2018-11-05 PROCEDURE — 94640 AIRWAY INHALATION TREATMENT: CPT

## 2018-11-05 PROCEDURE — 65660000000 HC RM CCU STEPDOWN

## 2018-11-05 PROCEDURE — 74011250637 HC RX REV CODE- 250/637: Performed by: EMERGENCY MEDICINE

## 2018-11-05 PROCEDURE — 74011250636 HC RX REV CODE- 250/636: Performed by: EMERGENCY MEDICINE

## 2018-11-05 PROCEDURE — 87077 CULTURE AEROBIC IDENTIFY: CPT | Performed by: EMERGENCY MEDICINE

## 2018-11-05 PROCEDURE — 81001 URINALYSIS AUTO W/SCOPE: CPT | Performed by: EMERGENCY MEDICINE

## 2018-11-05 PROCEDURE — 74011000250 HC RX REV CODE- 250: Performed by: INTERNAL MEDICINE

## 2018-11-05 PROCEDURE — 80053 COMPREHEN METABOLIC PANEL: CPT | Performed by: EMERGENCY MEDICINE

## 2018-11-05 PROCEDURE — 96367 TX/PROPH/DG ADDL SEQ IV INF: CPT

## 2018-11-05 PROCEDURE — 87040 BLOOD CULTURE FOR BACTERIA: CPT | Performed by: EMERGENCY MEDICINE

## 2018-11-05 PROCEDURE — 99285 EMERGENCY DEPT VISIT HI MDM: CPT

## 2018-11-05 RX ORDER — PHENAZOPYRIDINE HYDROCHLORIDE 200 MG/1
TABLET, FILM COATED ORAL 3 TIMES DAILY
Status: ON HOLD | COMMUNITY
End: 2018-11-08

## 2018-11-05 RX ORDER — ACETAMINOPHEN 325 MG/1
650 TABLET ORAL
Status: DISCONTINUED | OUTPATIENT
Start: 2018-11-05 | End: 2018-11-08 | Stop reason: HOSPADM

## 2018-11-05 RX ORDER — SODIUM CHLORIDE 0.9 % (FLUSH) 0.9 %
5-10 SYRINGE (ML) INJECTION AS NEEDED
Status: DISCONTINUED | OUTPATIENT
Start: 2018-11-05 | End: 2018-11-08 | Stop reason: HOSPADM

## 2018-11-05 RX ORDER — OSELTAMIVIR PHOSPHATE 75 MG/1
75 CAPSULE ORAL 2 TIMES DAILY
COMMUNITY
End: 2018-11-08

## 2018-11-05 RX ORDER — ENOXAPARIN SODIUM 100 MG/ML
40 INJECTION SUBCUTANEOUS EVERY 24 HOURS
Status: DISCONTINUED | OUTPATIENT
Start: 2018-11-05 | End: 2018-11-06

## 2018-11-05 RX ORDER — BENZONATATE 100 MG/1
100 CAPSULE ORAL
Status: DISCONTINUED | OUTPATIENT
Start: 2018-11-05 | End: 2018-11-08 | Stop reason: HOSPADM

## 2018-11-05 RX ORDER — SODIUM CHLORIDE 0.9 % (FLUSH) 0.9 %
5-10 SYRINGE (ML) INJECTION EVERY 8 HOURS
Status: DISCONTINUED | OUTPATIENT
Start: 2018-11-05 | End: 2018-11-08 | Stop reason: HOSPADM

## 2018-11-05 RX ORDER — ACETAMINOPHEN 325 MG/1
650 TABLET ORAL
COMMUNITY

## 2018-11-05 RX ORDER — NITROFURANTOIN (MACROCRYSTALS) 100 MG/1
100 CAPSULE ORAL 2 TIMES DAILY
Status: ON HOLD | COMMUNITY
End: 2018-11-08

## 2018-11-05 RX ORDER — ACETAMINOPHEN 325 MG/1
650 TABLET ORAL
Status: COMPLETED | OUTPATIENT
Start: 2018-11-05 | End: 2018-11-05

## 2018-11-05 RX ORDER — LORAZEPAM 1 MG/1
1 TABLET ORAL
COMMUNITY

## 2018-11-05 RX ORDER — IPRATROPIUM BROMIDE AND ALBUTEROL SULFATE 2.5; .5 MG/3ML; MG/3ML
3 SOLUTION RESPIRATORY (INHALATION)
Status: DISCONTINUED | OUTPATIENT
Start: 2018-11-05 | End: 2018-11-06

## 2018-11-05 RX ADMIN — ENOXAPARIN SODIUM 40 MG: 40 INJECTION, SOLUTION INTRAVENOUS; SUBCUTANEOUS at 21:29

## 2018-11-05 RX ADMIN — Medication 10 ML: at 21:30

## 2018-11-05 RX ADMIN — SODIUM CHLORIDE 1000 ML: 900 INJECTION, SOLUTION INTRAVENOUS at 13:02

## 2018-11-05 RX ADMIN — BENZONATATE 100 MG: 100 CAPSULE ORAL at 19:27

## 2018-11-05 RX ADMIN — ACETAMINOPHEN 650 MG: 325 TABLET ORAL at 18:24

## 2018-11-05 RX ADMIN — Medication 10 ML: at 18:00

## 2018-11-05 RX ADMIN — CEFTRIAXONE 1 G: 1 INJECTION, POWDER, FOR SOLUTION INTRAMUSCULAR; INTRAVENOUS at 13:02

## 2018-11-05 RX ADMIN — IPRATROPIUM BROMIDE AND ALBUTEROL SULFATE 3 ML: .5; 3 SOLUTION RESPIRATORY (INHALATION) at 20:40

## 2018-11-05 RX ADMIN — AZITHROMYCIN MONOHYDRATE 500 MG: 500 INJECTION, POWDER, LYOPHILIZED, FOR SOLUTION INTRAVENOUS at 14:57

## 2018-11-05 RX ADMIN — ACETAMINOPHEN 650 MG: 325 TABLET ORAL at 12:05

## 2018-11-05 NOTE — PROGRESS NOTES
11/05/18 1700   Vitals   Temp 99.1 °F (37.3 °C)   Temp Source Oral   Pulse (Heart Rate) (!) 115   Resp Rate 24   O2 Sat (%) 92 %   Level of Consciousness Alert   /49   MAP (Calculated) 68   MEWS Score 4   Pain 1   Pain Scale 1 Numeric (0 - 10)   Pain Intensity 1 0   Patient Stated Pain Goal 0   POSS Scale   Opioid Sedation Scale 1   MEWS of 4, temp 99.1, , O2 89% RA. Admitting hospitalist Dr. Neymar Mancera aware. Ordered PRN Tylenol for fever. Will continue to monitor.

## 2018-11-05 NOTE — PROGRESS NOTES
Pharmacy Clarification of the Prior to Admission Medication Regimen Retrospective to the Admission Medication Reconciliation    The patient was interviewed regarding clarification of the prior to admission medication regimen, and was questioned regarding use of any other inhalers, topical products, over the counter medications, herbal medications, vitamin products or ophthalmic/nasal/otic medication use. Information Obtained From: Patient, RX Query    Recommendations/Findings: The following amendments were made to the patient's active medication list on file at [facility]:     1) Additions:    acetaminophen (TYLENOL) 325 mg tablet   oseltamivir (TAMIFLU) 75 mg capsule    2) Removals:    Vitamin C   Cod liver oil   Vitamin B-12   Hydrocodone-APAP    Multivitamin   Naloxone   Zofran   Florastor   Sucralfate   Vagifem    3) Changes: None    4) Pertinent Pharmacy Findings:   Updated patients preferred outpatient pharmacy to: [address] [place] [address]    phenazopyridine (PYRIDIUM) 200 mg tablet, nitrofurantoin (MACRODANTIN) 100 mg capsule: Per patient, patient was recently prescribed these agents, but patient was instructed to discontinue these agents after the first dose by PCP. PTA medication list was corrected to the following:     Prior to Admission Medications   Prescriptions Last Dose Informant Patient Reported? Taking? LORazepam (ATIVAN) 1 mg tablet 11/3/2018 at Unknown time Self Yes Yes   Sig: Take 1 mg by mouth nightly as needed for Anxiety. acetaminophen (TYLENOL) 325 mg tablet 11/4/2018 at Unknown time Self Yes Yes   Sig: Take 650 mg by mouth daily as needed for Pain. lisinopril-hydrochlorothiazide (PRINZIDE, ZESTORETIC) 20-12.5 mg per tablet 11/3/2018 at Unknown time Self Yes Yes   Sig: Take 1 Tab by mouth daily. nitrofurantoin (MACRODANTIN) 100 mg capsule Not Taking at Unknown time Self Yes No   Sig: Take 100 mg by mouth two (2) times a day. oseltamivir (TAMIFLU) 75 mg capsule 11/5/2018 at Unknown time Self Yes Yes   Sig: Take 75 mg by mouth two (2) times a day. phenazopyridine (PYRIDIUM) 200 mg tablet Not Taking at Unknown time Self Yes No   Sig: Take  by mouth three (3) times daily.       Facility-Administered Medications: None          Thank you,  Sonja Santamaria  PharmD candidate Class of 2019

## 2018-11-05 NOTE — H&P
Hospitalist Admission Note    NAME: Ricardo Harley   :  1960   MRN:  [id number]     Date/Time:  2018 3:29 PM    Patient PCP: Festus Nagel MD  ________________________________________________________________________    My assessment of this patient's clinical condition and my plan of care is as follows. Assessment / Plan:    1) Pneumonia: Continue ABX, Nebs, f/u CX    2) HTN: Well controlled continue home meds    3) CKD: Continue to observe, AM labs      Code Status: full  Surrogate Decision Maker:    DVT Prophylaxis: yes  GI Prophylaxis: not indicated    Baseline: Lives at home with family        Subjective:   CHIEF COMPLAINT: Cough, fever, malaise    HISTORY OF PRESENT ILLNESS:     Ricardo Harley is a 59 y.o. female with PMH, CKD, HTN, gastric bypass, who presents ambulatory to the ED because cough, fevers for the past 4 days. She went to an urgent care last Saturday she was tested for the flu (neg) despite that she got tamiflu, no ABX, her cough, sob got worse and she developed fever, for all this she decided to come today for further eval and management. We were asked to admit for work up and evaluation of the above problems.      Past Medical History:   Diagnosis Date    Cancer [facility])     multiple Myolomia    Chronic kidney disease     -dr tillman    Dyspepsia 07    Edema of both legs 07    FH: CAD (coronary artery disease)     pt denies cad    Gastric bypass status for obesity 2007    [facility]    Gastrointestinal disorder     gastric bypass , and unclers    Headache(784.0) 07    HTN (hypertension) 07    Joint pain 07    Morbid obesity ([address] 07    Multiple myeloma     dr Westley Casiano- diagnosed     Multiple myeloma, without mention of having achieved remission 2011    Perforated viscus 2014    S/P gastric bypass 2011    Sleep apnea 07    Stool color black         Past Surgical History: Procedure Laterality Date    HX GASTRIC BYPASS  7/9/07    GASTRIC BYPASS--MONTOYA    HX GYN  2001    hysterectomy    HX TOTAL ABDOMINAL HYSTERECTOMY  2002       Social History     Tobacco Use    Smoking status: Never Smoker    Smokeless tobacco: Never Used   Substance Use Topics    Alcohol use: No        Family History   Problem Relation Age of Onset    Hypertension Mother     Diabetes Father     Heart Disease Father     Stroke Father     Breast Cancer Cousin 39        bilateral mastectomy     Allergies   Allergen Reactions    Bactrim [Sulfamethoprim Ds] Nausea and Vomiting    Bactrim [Sulfamethoprim] Nausea and Vomiting        Prior to Admission medications    Medication Sig Start Date End Date Taking? Authorizing Provider   ondansetron (ZOFRAN ODT) 4 mg disintegrating tablet Take 1 Tab by mouth every eight (8) hours as needed for Nausea. 3/1/18   Tessa Gottlieb MD   HYDROcodone-acetaminophen (NORCO) 5-325 mg per tablet Take 2 Tabs by mouth every four (4) hours as needed. 3/1/18   Tessa Gottlieb MD   naloxone 2 mg/actuation spry Use 1 spray intranasally into 1 nostril. Use a new Narcan nasal spray for subsequent doses and administer into alternating nostrils. May repeat every 2 to 3 minutes as needed. 3/1/18   Tessa Gottlieb MD   sucralfate (CARAFATE) 1 gram tablet Take 1 Tab by mouth four (4) times daily. May dissolve in 15 ml of water 8/14/17   ROLY Wynn   saccharomyces boulardii (FLORASTOR) 250 mg capsule Take 1 capsule by mouth two (2) times a day. 9/4/14   Gayle Alberts NP   lisinopril-hydrochlorothiazide (PRINZIDE, ZESTORETIC) 20-12.5 mg per tablet Take 1 Tab by mouth daily. Other, MD Shruthi   COD LIVER OIL PO Take  by mouth daily. Provider, Historical   ascorbic acid (VITAMIN C) 500 mg tablet Take 500 mg by mouth daily. Provider, Historical   VAGIFEM 10 mcg Tab vaginal tablet 10 mcg every other day.  12/9/11   Provider, Historical   LORazepam (ATIVAN) 1 mg tablet Take 1 Tab by mouth nightly as needed. 2/10/12   Belen Kwong MD   CYANOCOBALAMIN, VITAMIN B-12, (VITAMIN B-12 PO) Take  by mouth. Provider, Historical   multivitamins with iron Tab Take 18 mg by mouth two (2) times a day. 18mg of iron 9/3/10   Gallito Hu NP       REVIEW OF SYSTEMS:     I am not able to complete the review of systems because:    The patient is intubated and sedated    The patient has altered mental status due to his acute medical problems    The patient has baseline aphasia from prior stroke(s)    The patient has baseline dementia and is not reliable historian    The patient is in acute medical distress and unable to provide information           Total of 12 systems reviewed as follows:       POSITIVE= underlined text  Negative = text not underlined  General:  fever, chills, sweats, generalized weakness, weight loss/gain,      loss of appetite   Eyes:    blurred vision, eye pain, loss of vision, double vision  ENT:    rhinorrhea, pharyngitis   Respiratory:   cough, sputum production, SOB, HARTLEY, wheezing, pleuritic pain   Cardiology:   chest pain, palpitations, orthopnea, PND, edema, syncope   Gastrointestinal:  abdominal pain , N/V, diarrhea, dysphagia, constipation, bleeding   Genitourinary:  frequency, urgency, dysuria, hematuria, incontinence   Muskuloskeletal :  arthralgia, myalgia, back pain  Hematology:  easy bruising, nose or gum bleeding, lymphadenopathy   Dermatological: rash, ulceration, pruritis, color change / jaundice  Endocrine:   hot flashes or polydipsia   Neurological:  headache, dizziness, confusion, focal weakness, paresthesia,     Speech difficulties, memory loss, gait difficulty  Psychological: Feelings of anxiety, depression, agitation    Objective:   VITALS:    Visit Vitals  /85 (BP 1 Location: Right arm, BP Patient Position: Sitting)   Pulse (!) 102   Temp 99.5 °F (37.5 °C)   Resp 21   Ht 5' 6\" (1.676 m)   Wt 63.2 kg (139 lb 5.3 oz)   SpO2 93%   BMI 22.49 kg/m²       PHYSICAL EXAM:    General:    Alert, cooperative, no distress, appears stated age. HEENT: Atraumatic, anicteric sclerae, pink conjunctivae     No oral ulcers, mucosa moist, throat clear, dentition fair  Neck:  Supple, symmetrical,  thyroid: non tender  Lungs:   Decreased sounds, R base   Chest wall:  No tenderness  No Accessory muscle use. Heart:   Regular  rhythm,  No  murmur   No edema  Abdomen:   Soft, non-tender. Not distended. Bowel sounds normal  Extremities: No cyanosis. No clubbing,      Skin turgor normal, Capillary refill normal, Radial dial pulse 2+  Skin:     Not pale. Not Jaundiced  No rashes   Psych:  Good insight. Not depressed. Not anxious or agitated. Neurologic: EOMs intact. No facial asymmetry. No aphasia or slurred speech. Symmetrical strength, Sensation grossly intact. Alert and oriented X 4.     _______________________________________________________________________  Care Plan discussed with:    Comments   Patient x    Family      RN     Care Manager                    Consultant:      _______________________________________________________________________  Expected  Disposition:   Home with Family x   HH/PT/OT/RN    SNF/LTC    DEBI    ________________________________________________________________________  TOTAL TIME:  61 Minutes    Critical Care Provided     Minutes non procedure based      Comments     Reviewed previous records   >50% of visit spent in counseling and coordination of care  Discussion with patient and/or family and questions answered       ________________________________________________________________________  Signed: Victoriano Pearson MD    Procedures: see electronic medical records for all procedures/Xrays and details which were not copied into this note but were reviewed prior to creation of Plan.     LAB DATA REVIEWED:    Recent Results (from the past 24 hour(s))   EKG, 12 LEAD, INITIAL    Collection Time: 11/05/18 11:55 AM   Result Value Ref Range    Ventricular Rate 116 BPM    Atrial Rate 116 BPM    P-R Interval 148 ms    QRS Duration 72 ms    Q-T Interval 324 ms    QTC Calculation (Bezet) 450 ms    Calculated P Axis 68 degrees    Calculated R Axis -5 degrees    Calculated T Axis 63 degrees    Diagnosis       Sinus tachycardia  When compared with ECG of 18-JUL-2014 13:42,  No significant change was found  Confirmed by Job Ribeiro ([id number]) on 11/5/2018 4:41:65 PM     METABOLIC PANEL, COMPREHENSIVE    Collection Time: 11/05/18 12:00 PM   Result Value Ref Range    Sodium 136 136 - 145 mmol/L    Potassium 3.7 3.5 - 5.1 mmol/L    Chloride 105 97 - 108 mmol/L    CO2 20 (L) 21 - 32 mmol/L    Anion gap 11 5 - 15 mmol/L    Glucose 107 (H) 65 - 100 mg/dL    BUN 34 (H) 6 - 20 MG/DL    Creatinine 1.62 (H) 0.55 - 1.02 MG/DL    BUN/Creatinine ratio 21 (H) 12 - 20      GFR est AA 39 (L) >60 ml/min/1.73m2    GFR est non-AA 32 (L) >60 ml/min/1.73m2    Calcium 8.4 (L) 8.5 - 10.1 MG/DL    Bilirubin, total 0.8 0.2 - 1.0 MG/DL    ALT (SGPT) 16 12 - 78 U/L    AST (SGOT) 24 15 - 37 U/L    Alk. phosphatase 52 45 - 117 U/L    Protein, total 7.5 6.4 - 8.2 g/dL    Albumin 3.1 (L) 3.5 - 5.0 g/dL    Globulin 4.4 (H) 2.0 - 4.0 g/dL    A-G Ratio 0.7 (L) 1.1 - 2.2     CBC WITH AUTOMATED DIFF    Collection Time: 11/05/18 12:00 PM   Result Value Ref Range    WBC 6.7 3.6 - 11.0 K/uL    RBC 4.06 3.80 - 5.20 M/uL    HGB 12.3 11.5 - 16.0 g/dL    HCT 38.2 35.0 - 47.0 %    MCV 94.1 80.0 - 99.0 FL    MCH 30.3 26.0 - 34.0 PG    MCHC 32.2 30.0 - 36.5 g/dL    RDW 13.5 11.5 - 14.5 %    PLATELET 179 (L) 743 - 400 K/uL    MPV 9.0 8.9 - 12.9 FL    NRBC 0.0 0  WBC    ABSOLUTE NRBC 0.00 0.00 - 0.01 K/uL    NEUTROPHILS 75 32 - 75 %    BAND NEUTROPHILS 14 %    LYMPHOCYTES 7 (L) 12 - 49 %    MONOCYTES 4 (L) 5 - 13 %    EOSINOPHILS 0 0 - 7 %    BASOPHILS 0 0 - 1 %    IMMATURE GRANULOCYTES 0 0.0 - 0.5 %    ABS. NEUTROPHILS 5.9 1.8 - 8.0 K/UL    ABS. LYMPHOCYTES 0.5 (L) 0.8 - 3.5 K/UL    ABS. MONOCYTES 0.3 0.0 - 1.0 K/UL    ABS. EOSINOPHILS 0.0 0.0 - 0.4 K/UL    ABS. BASOPHILS 0.0 0.0 - 0.1 K/UL    ABS. IMM.  GRANS. 0.0 0.00 - 0.04 K/UL    DF MANUAL      RBC COMMENTS NORMOCYTIC, NORMOCHROMIC     TROPONIN I    Collection Time: 11/05/18 12:00 PM   Result Value Ref Range    Troponin-I, Qt. <0.05 <0.05 ng/mL   CK W/ REFLX CKMB    Collection Time: 11/05/18 12:00 PM   Result Value Ref Range    CK 72 26 - 192 U/L   POC LACTIC ACID    Collection Time: 11/05/18 12:08 PM   Result Value Ref Range    Lactic Acid (POC) 1.24 0.40 - 2.00 mmol/L   URINALYSIS W/ REFLEX CULTURE    Collection Time: 11/05/18  1:01 PM   Result Value Ref Range    Color DARK YELLOW      Appearance CLEAR CLEAR      Specific gravity 1.016 1.003 - 1.030      pH (UA) 6.0 5.0 - 8.0      Protein 300 (A) NEG mg/dL    Glucose NEGATIVE  NEG mg/dL    Ketone 15 (A) NEG mg/dL    Blood LARGE (A) NEG      Urobilinogen 1.0 0.2 - 1.0 EU/dL    Nitrites POSITIVE (A) NEG      Leukocyte Esterase TRACE (A) NEG      WBC 5-10 0 - 4 /hpf    RBC 10-20 0 - 5 /hpf    Epithelial cells MODERATE (A) FEW /lpf    Bacteria 1+ (A) NEG /hpf    UA:UC IF INDICATED URINE CULTURE ORDERED (A) CNI      Yeast PRESENT (A) NEG      Other: Renal Epithelial cells Present     BILIRUBIN, CONFIRM    Collection Time: 11/05/18  1:01 PM   Result Value Ref Range    Bilirubin UA, confirm POSITIVE (A) NEG

## 2018-11-05 NOTE — ED NOTES
TRANSFER - OUT REPORT:    Verbal report given to Courtney RN(name) on Rao Myers  being transferred to [facility](unit) for routine progression of care       Report consisted of patients Situation, Background, Assessment and   Recommendations(SBAR). Information from the following report(s) SBAR, Kardex, ED Summary, Procedure Summary, Intake/Output and MAR was reviewed with the receiving nurse. Lines:   Peripheral IV 11/05/18 Right Antecubital (Active)   Site Assessment Clean, dry, & intact 11/5/2018  1:06 PM   Phlebitis Assessment 0 11/5/2018  1:06 PM   Infiltration Assessment 0 11/5/2018  1:06 PM   Dressing Status Clean, dry, & intact 11/5/2018  1:06 PM   Dressing Type Transparent 11/5/2018  1:06 PM   Hub Color/Line Status Pink;Flushed 11/5/2018  1:06 PM   Alcohol Cap Used Yes 11/5/2018  1:06 PM        Opportunity for questions and clarification was provided.       Patient transported with:   [employer]

## 2018-11-05 NOTE — PROGRESS NOTES
1600: TRANSFER - IN REPORT:    Verbal report received from Anamaria (name) on Lucy Wang  being received from ED (unit) for routine progression of care      Report consisted of patients Situation, Background, Assessment and   Recommendations(SBAR). Information from the following report(s) SBAR, Kardex, ED Summary, Intake/Output, MAR, Recent Results and Cardiac Rhythm sinus tach was reviewed with the receiving nurse. Opportunity for questions and clarification was provided. Assessment completed upon patients arrival to unit and care assumed.

## 2018-11-05 NOTE — PROGRESS NOTES
11/05/18 1805   Vitals   Temp (!) 101 °F (38.3 °C)   Temp Source Oral   Pulse (Heart Rate) (!) 104   Resp Rate 22   O2 Sat (%) 91 %   Level of Consciousness Alert   /89   MAP (Calculated) 105   MEWS Score 3   MEWS of 3, Temp 101, , BP better, O2 91% on 2L, will give PRN tylenol.

## 2018-11-05 NOTE — ED PROVIDER NOTES
EMERGENCY DEPARTMENT HISTORY AND PHYSICAL EXAM      Date: 11/5/2018  Patient Name: Ilsa Granados    History of Presenting Illness     Chief Complaint   Patient presents with    Chest Pain     with sob x three days; tested neg for flu at PCP       History Provided By: Patient    HPI: Ilsa Granados, 59 y.o. female with PMHx significant for CAD, CKD, HTN, s/p gastric bypass, presents ambulatory to the ED with cc of persistent, worsening chest pain, epigastric pain, SOB, n/v/d, and recurrent fever (tmax 102.3) x 3 days. Today pt reports rhinorrhea and cough. Pt states she was seen by her PCP on Saturday, 11/3 regarding sx's where flu was negative, but pt was treated empirically with Tamiflu. She states she was also started on Macrobid for a UTI, but explains she received a call and advised to discontinue to the abx as her urine culture came back negative. She states she continues to have dysuria. Pt denies prior hx of bronchitis and notes her PCP did not do a CXR. She specifically denies any eye discharge or hematochezia. There are no other complaints, changes, or physical findings at this time. PCP: Raymond Fierro MD    Current Facility-Administered Medications   Medication Dose Route Frequency Provider Last Rate Last Dose    [START ON 11/6/2018] cefTRIAXone (ROCEPHIN) 1 g in 0.9% sodium chloride (MBP/ADV) 50 mL  1 g IntraVENous Q24H Leland Garcia MD        albuterol-ipratropium (DUO-NEB) 2.5 MG-0.5 MG/3 ML  3 mL Nebulization Q6H RT Abel Garcia MD        benzonatate (TESSALON) capsule 100 mg  100 mg Oral TID PRN Darian Soares MD        Yasemin Prow ON 11/6/2018] azithromycin (ZITHROMAX) 500 mg in 0.9% sodium chloride 250 mL ([id number])  500 mg IntraVENous Q24H Darian Soares MD         Current Outpatient Medications   Medication Sig Dispense Refill    LORazepam (ATIVAN) 1 mg tablet Take 1 mg by mouth nightly as needed for Anxiety.       acetaminophen (TYLENOL) 325 mg tablet Take 650 mg by mouth daily as needed for Pain.  oseltamivir (TAMIFLU) 75 mg capsule Take 75 mg by mouth two (2) times a day.  lisinopril-hydrochlorothiazide (PRINZIDE, ZESTORETIC) 20-12.5 mg per tablet Take 1 Tab by mouth daily.  nitrofurantoin (MACRODANTIN) 100 mg capsule Take 100 mg by mouth two (2) times a day.  phenazopyridine (PYRIDIUM) 200 mg tablet Take  by mouth three (3) times daily. Past History     Past Medical History:  Past Medical History:   Diagnosis Date    Cancer ([facility] [address]     multiple Myolomia    Chronic kidney disease     2010-dr tillman    Dyspepsia 4/11/07    Edema of both legs 4/11/07    FH: CAD (coronary artery disease)     pt denies cad    [facility] for [facility] 2007    [facility]    Gastrointestinal disorder     gastric bypass , and unclers    Headache(784.0) 4/11/07    HTN (hypertension) 4/11/07    Joint pain 4/11/07    Morbid obesity ([facility] [address] 4/11/07    Multiple myeloma     dr Valenzuela Gut- diagnosed 2010    Multiple myeloma, without mention of having achieved remission 11/4/2011    Perforated viscus 7/18/2014    S/P gastric bypass 11/4/2011    Sleep apnea 4/11/07    Stool color black        Past Surgical History:  Past Surgical History:   Procedure Laterality Date    HX GASTRIC BYPASS  7/9/07    GASTRIC BYPASS--MONTOYA    HX GYN  2001    hysterectomy    HX TOTAL ABDOMINAL HYSTERECTOMY  2002       Family History:  Family History   Problem Relation Age of Onset    Hypertension Mother     Diabetes Father     Heart Disease Father     Stroke Father     Breast Cancer Cousin 41        bilateral mastectomy       Social History:  Social History     Tobacco Use    Smoking status: Never Smoker    Smokeless tobacco: Never Used   Substance Use Topics    Alcohol use: No    Drug use: Not on file       Allergies:   Allergies   Allergen Reactions    Bactrim [Sulfamethoprim Ds] Nausea and Vomiting    Bactrim [Sulfamethoprim] Nausea and Vomiting         Review of Systems   Review of Systems   Constitutional: Positive for fever. Negative for chills. HENT: Positive for rhinorrhea. Eyes: Negative for discharge. Respiratory: Positive for cough and shortness of breath. Cardiovascular: Positive for chest pain. Gastrointestinal: Positive for abdominal pain, diarrhea, nausea and vomiting. Negative for blood in stool and constipation. Genitourinary: Positive for dysuria. Neurological: Negative for weakness and numbness. All other systems reviewed and are negative. Physical Exam   Physical Exam   Constitutional: She is oriented to person, place, and time. She appears well-developed and well-nourished. HENT:   Head: Normocephalic and atraumatic. Eyes: Conjunctivae and EOM are normal.   Neck: Normal range of motion. Neck supple. Cardiovascular: Regular rhythm. Tachycardia present. Pulmonary/Chest: Effort normal and breath sounds normal. No respiratory distress. Abdominal: Soft. She exhibits no distension. There is tenderness in the epigastric area. Musculoskeletal: Normal range of motion. Neurological: She is alert and oriented to person, place, and time. Skin: Skin is warm and dry. Warm to touch   Psychiatric: She has a normal mood and affect. Nursing note and vitals reviewed.       Diagnostic Study Results     Labs -     Recent Results (from the past 12 hour(s))   EKG, 12 LEAD, INITIAL    Collection Time: 11/05/18 11:55 AM   Result Value Ref Range    Ventricular Rate 116 BPM    Atrial Rate 116 BPM    P-R Interval 148 ms    QRS Duration 72 ms    Q-T Interval 324 ms    QTC Calculation (Bezet) 450 ms    Calculated P Axis 68 degrees    Calculated R Axis -5 degrees    Calculated T Axis 63 degrees    Diagnosis       Sinus tachycardia  When compared with ECG of 18-JUL-2014 13:42,  No significant change was found  Confirmed by Mary Colemna ([id number]) on 11/5/2018 7:28:05 PM     METABOLIC PANEL, COMPREHENSIVE    Collection Time: 11/05/18 12:00 PM   Result Value Ref Range    Sodium 136 136 - 145 mmol/L    Potassium 3.7 3.5 - 5.1 mmol/L    Chloride 105 97 - 108 mmol/L    CO2 20 (L) 21 - 32 mmol/L    Anion gap 11 5 - 15 mmol/L    Glucose 107 (H) 65 - 100 mg/dL    BUN 34 (H) 6 - 20 MG/DL    Creatinine 1.62 (H) 0.55 - 1.02 MG/DL    BUN/Creatinine ratio 21 (H) 12 - 20      GFR est AA 39 (L) >60 ml/min/1.73m2    GFR est non-AA 32 (L) >60 ml/min/1.73m2    Calcium 8.4 (L) 8.5 - 10.1 MG/DL    Bilirubin, total 0.8 0.2 - 1.0 MG/DL    ALT (SGPT) 16 12 - 78 U/L    AST (SGOT) 24 15 - 37 U/L    Alk. phosphatase 52 45 - 117 U/L    Protein, total 7.5 6.4 - 8.2 g/dL    Albumin 3.1 (L) 3.5 - 5.0 g/dL    Globulin 4.4 (H) 2.0 - 4.0 g/dL    A-G Ratio 0.7 (L) 1.1 - 2.2     CBC WITH AUTOMATED DIFF    Collection Time: 11/05/18 12:00 PM   Result Value Ref Range    WBC 6.7 3.6 - 11.0 K/uL    RBC 4.06 3.80 - 5.20 M/uL    HGB 12.3 11.5 - 16.0 g/dL    HCT 38.2 35.0 - 47.0 %    MCV 94.1 80.0 - 99.0 FL    MCH 30.3 26.0 - 34.0 PG    MCHC 32.2 30.0 - 36.5 g/dL    RDW 13.5 11.5 - 14.5 %    PLATELET 405 (L) 387 - 400 K/uL    MPV 9.0 8.9 - 12.9 FL    NRBC 0.0 0  WBC    ABSOLUTE NRBC 0.00 0.00 - 0.01 K/uL    NEUTROPHILS 75 32 - 75 %    BAND NEUTROPHILS 14 %    LYMPHOCYTES 7 (L) 12 - 49 %    MONOCYTES 4 (L) 5 - 13 %    EOSINOPHILS 0 0 - 7 %    BASOPHILS 0 0 - 1 %    IMMATURE GRANULOCYTES 0 0.0 - 0.5 %    ABS. NEUTROPHILS 5.9 1.8 - 8.0 K/UL    ABS. LYMPHOCYTES 0.5 (L) 0.8 - 3.5 K/UL    ABS. MONOCYTES 0.3 0.0 - 1.0 K/UL    ABS. EOSINOPHILS 0.0 0.0 - 0.4 K/UL    ABS. BASOPHILS 0.0 0.0 - 0.1 K/UL    ABS. IMM.  GRANS. 0.0 0.00 - 0.04 K/UL    DF MANUAL      RBC COMMENTS NORMOCYTIC, NORMOCHROMIC     TROPONIN I    Collection Time: 11/05/18 12:00 PM   Result Value Ref Range    Troponin-I, Qt. <0.05 <0.05 ng/mL   CK W/ REFLX CKMB    Collection Time: 11/05/18 12:00 PM   Result Value Ref Range    CK 72 26 - 192 U/L   POC LACTIC ACID Collection Time: 11/05/18 12:08 PM   Result Value Ref Range    Lactic Acid (POC) 1.24 0.40 - 2.00 mmol/L   URINALYSIS W/ REFLEX CULTURE    Collection Time: 11/05/18  1:01 PM   Result Value Ref Range    Color DARK YELLOW      Appearance CLEAR CLEAR      Specific gravity 1.016 1.003 - 1.030      pH (UA) 6.0 5.0 - 8.0      Protein 300 (A) NEG mg/dL    Glucose NEGATIVE  NEG mg/dL    Ketone 15 (A) NEG mg/dL    Blood LARGE (A) NEG      Urobilinogen 1.0 0.2 - 1.0 EU/dL    Nitrites POSITIVE (A) NEG      Leukocyte Esterase TRACE (A) NEG      WBC 5-10 0 - 4 /hpf    RBC 10-20 0 - 5 /hpf    Epithelial cells MODERATE (A) FEW /lpf    Bacteria 1+ (A) NEG /hpf    UA:UC IF INDICATED URINE CULTURE ORDERED (A) CNI      Yeast PRESENT (A) NEG      Other: Renal Epithelial cells Present     BILIRUBIN, CONFIRM    Collection Time: 11/05/18  1:01 PM   Result Value Ref Range    Bilirubin UA, confirm POSITIVE (A) NEG         Radiologic Studies -   CXR Results  (Last 48 hours)               11/05/18 1250  XR CHEST PA LAT Final result    Impression:  IMPRESSION: Right lower lobe and right upper lung patchy airspace disease. Narrative: Indication: Chest pain, shortness of breath for 3 days. Exam: PA and lateral views of the chest.       Direct comparison is made to prior CXR dated March 2018. Findings: Cardiomediastinal silhouette is within normal limits. There is patchy   airspace disease within the right lower lobe and right upper lung. Left lung is   clear. There is no pleural fluid. There is no pneumothorax. Medical Decision Making   I am the first provider for this patient. I reviewed the vital signs, available nursing notes, past medical history, past surgical history, family history and social history. Vital Signs-Reviewed the patient's vital signs.   Patient Vitals for the past 12 hrs:   Temp Pulse Resp BP SpO2   11/05/18 1445 99.5 °F (37.5 °C) (!) 102 21 123/85 96 %   11/05/18 1345 -- (!) 102 24 (!) 140/94 96 %   11/05/18 1335 -- 96 25 -- 93 %   11/05/18 1226 -- (!) 113 16 (!) 150/94 92 %   11/05/18 1152 (!) 101.1 °F (38.4 °C) (!) 135 20 (!) 158/93 92 %       Pulse Oximetry Analysis - 92% on RA    Cardiac Monitor:   Rate: 135 bpm  Rhythm: Sinus Tachycardia      EKG interpretation: (Preliminary)  1155  Rhythm: sinus tachycardia; and regular . Rate (approx.): 116; Axis: normal; OH interval: normal; QRS interval: normal ; ST/T wave: normal.  Written by Lili Kay, ED Scribe, as dictated by Chava Vila M.D. Records Reviewed: Nursing Notes, Old Medical Records, Previous Radiology Studies and Previous Laboratory Studies    Provider Notes (Medical Decision Making):   Patient presents with fever, tachycardia and concerns for infection. Most likely acute bronchitis versus PNA. DDx: sepsis 2/2 UTI, PNA, intraabdominal infection (colitis, appendicitis, cholecystitis),  infectious diarrhea, meningitis, soft tissue infection, septic arthritis, flu/viral prodrome. Will follow sepsis protocol and order set by obtaining fluids, antibiotics, labs, lactate, EKG and frequently reassessing hemodynamic status on the patient. Will hold off on aggressive fluid hydration unless patient shows signs of severe sepsis or shock    ED Course:   Initial assessment performed. The patients presenting problems have been discussed, and they are in agreement with the care plan formulated and outlined with them. I have encouraged them to ask questions as they arise throughout their visit. 1:49 PM  Updated pt on results. She remains tachycardiac and states she is not feeling well. Pt agrees she would rather be admitted. Written by Lili Kay, ED scribe, as dictated by Chava Vila M.D    CONSULT NOTE:   2:06 Zoila Briggs M.D spoke with Kaylen Banks MD   Specialty: Hospitalist  Discussed pt's hx, disposition, and available diagnostic and imaging results. Reviewed care plans.  Consultant will evaluate pt for admission. Written by Kelly Chacon, ED Scribe, as dictated by Alicia Burkitt, M.D. Critical Care Time: 0    Disposition:  ADMIT NOTE:  2:07 PM  The patient is being admitted to the hospital by Alanis Sandhu MD.  The results of their tests and reasons for their admission have been discussed with the patient and/or available family. They convey agreement and understanding for the need to be admitted and for their admission diagnosis. PLAN:  1. Admit to hospitalist     Diagnosis     Clinical Impression:   1. Sepsis, due to unspecified organism ([address]    2. Community acquired pneumonia of right lower lobe of lung ([address]        Attestations: This note is prepared by Kelly Chacon, acting as Scribe for Alicia Burkitt, M.D. Alicia Burkitt, M.D: The scribe's documentation has been prepared under my direction and personally reviewed by me in its entirety. I confirm that the note above accurately reflects all work, treatment, procedures, and medical decision making performed by me.

## 2018-11-06 PROBLEM — J18.9 PNEUMONIA: Status: ACTIVE | Noted: 2018-11-06

## 2018-11-06 PROBLEM — I48.0 PAF (PAROXYSMAL ATRIAL FIBRILLATION) (HCC): Status: ACTIVE | Noted: 2018-11-06

## 2018-11-06 PROBLEM — N39.0 UTI (URINARY TRACT INFECTION): Status: ACTIVE | Noted: 2018-11-06

## 2018-11-06 LAB
ANION GAP SERPL CALC-SCNC: 9 MMOL/L (ref 5–15)
ATRIAL RATE: 166 BPM
BUN SERPL-MCNC: 33 MG/DL (ref 6–20)
BUN/CREAT SERPL: 22 (ref 12–20)
CALCIUM SERPL-MCNC: 7.6 MG/DL (ref 8.5–10.1)
CALCULATED R AXIS, ECG10: 7 DEGREES
CALCULATED T AXIS, ECG11: -85 DEGREES
CHLORIDE SERPL-SCNC: 109 MMOL/L (ref 97–108)
CO2 SERPL-SCNC: 22 MMOL/L (ref 21–32)
CREAT SERPL-MCNC: 1.51 MG/DL (ref 0.55–1.02)
DIAGNOSIS, 93000: NORMAL
ERYTHROCYTE [DISTWIDTH] IN BLOOD BY AUTOMATED COUNT: 13.5 % (ref 11.5–14.5)
GLUCOSE SERPL-MCNC: 109 MG/DL (ref 65–100)
HCT VFR BLD AUTO: 33.1 % (ref 35–47)
HGB BLD-MCNC: 10.9 G/DL (ref 11.5–16)
MAGNESIUM SERPL-MCNC: 2.5 MG/DL (ref 1.6–2.4)
MCH RBC QN AUTO: 30.6 PG (ref 26–34)
MCHC RBC AUTO-ENTMCNC: 32.9 G/DL (ref 30–36.5)
MCV RBC AUTO: 93 FL (ref 80–99)
NRBC # BLD: 0 K/UL (ref 0–0.01)
NRBC BLD-RTO: 0 PER 100 WBC
PLATELET # BLD AUTO: 76 K/UL (ref 150–400)
PMV BLD AUTO: 8.7 FL (ref 8.9–12.9)
POTASSIUM SERPL-SCNC: 3.1 MMOL/L (ref 3.5–5.1)
Q-T INTERVAL, ECG07: 298 MS
QRS DURATION, ECG06: 84 MS
QTC CALCULATION (BEZET), ECG08: 486 MS
RBC # BLD AUTO: 3.56 M/UL (ref 3.8–5.2)
SODIUM SERPL-SCNC: 140 MMOL/L (ref 136–145)
VENTRICULAR RATE, ECG03: 160 BPM
WBC # BLD AUTO: 4.3 K/UL (ref 3.6–11)

## 2018-11-06 PROCEDURE — 83735 ASSAY OF MAGNESIUM: CPT | Performed by: HOSPITALIST

## 2018-11-06 PROCEDURE — 94640 AIRWAY INHALATION TREATMENT: CPT

## 2018-11-06 PROCEDURE — 93005 ELECTROCARDIOGRAM TRACING: CPT

## 2018-11-06 PROCEDURE — 90686 IIV4 VACC NO PRSV 0.5 ML IM: CPT | Performed by: INTERNAL MEDICINE

## 2018-11-06 PROCEDURE — 65660000000 HC RM CCU STEPDOWN

## 2018-11-06 PROCEDURE — 74011250636 HC RX REV CODE- 250/636: Performed by: INTERNAL MEDICINE

## 2018-11-06 PROCEDURE — 36415 COLL VENOUS BLD VENIPUNCTURE: CPT | Performed by: INTERNAL MEDICINE

## 2018-11-06 PROCEDURE — 90471 IMMUNIZATION ADMIN: CPT

## 2018-11-06 PROCEDURE — 74011250637 HC RX REV CODE- 250/637: Performed by: HOSPITALIST

## 2018-11-06 PROCEDURE — 74011250637 HC RX REV CODE- 250/637: Performed by: INTERNAL MEDICINE

## 2018-11-06 PROCEDURE — 80048 BASIC METABOLIC PNL TOTAL CA: CPT | Performed by: INTERNAL MEDICINE

## 2018-11-06 PROCEDURE — 77010033678 HC OXYGEN DAILY

## 2018-11-06 PROCEDURE — 74011250636 HC RX REV CODE- 250/636: Performed by: HOSPITALIST

## 2018-11-06 PROCEDURE — 74011250637 HC RX REV CODE- 250/637: Performed by: NURSE PRACTITIONER

## 2018-11-06 PROCEDURE — 74011000250 HC RX REV CODE- 250: Performed by: HOSPITALIST

## 2018-11-06 PROCEDURE — 94760 N-INVAS EAR/PLS OXIMETRY 1: CPT

## 2018-11-06 PROCEDURE — 74011000250 HC RX REV CODE- 250: Performed by: INTERNAL MEDICINE

## 2018-11-06 PROCEDURE — 74011000258 HC RX REV CODE- 258: Performed by: INTERNAL MEDICINE

## 2018-11-06 PROCEDURE — 85027 COMPLETE CBC AUTOMATED: CPT | Performed by: INTERNAL MEDICINE

## 2018-11-06 RX ORDER — LEVALBUTEROL INHALATION SOLUTION 1.25 MG/3ML
1.25 SOLUTION RESPIRATORY (INHALATION)
Status: DISCONTINUED | OUTPATIENT
Start: 2018-11-06 | End: 2018-11-08 | Stop reason: HOSPADM

## 2018-11-06 RX ORDER — ONDANSETRON 2 MG/ML
4 INJECTION INTRAMUSCULAR; INTRAVENOUS
Status: DISCONTINUED | OUTPATIENT
Start: 2018-11-06 | End: 2018-11-08 | Stop reason: HOSPADM

## 2018-11-06 RX ORDER — LANOLIN ALCOHOL/MO/W.PET/CERES
9 CREAM (GRAM) TOPICAL
Status: DISCONTINUED | OUTPATIENT
Start: 2018-11-06 | End: 2018-11-08 | Stop reason: HOSPADM

## 2018-11-06 RX ORDER — GUAIFENESIN 600 MG/1
600 TABLET, EXTENDED RELEASE ORAL 2 TIMES DAILY
Status: DISCONTINUED | OUTPATIENT
Start: 2018-11-06 | End: 2018-11-08 | Stop reason: HOSPADM

## 2018-11-06 RX ORDER — METOPROLOL TARTRATE 25 MG/1
25 TABLET, FILM COATED ORAL 2 TIMES DAILY
Status: DISCONTINUED | OUTPATIENT
Start: 2018-11-06 | End: 2018-11-06

## 2018-11-06 RX ORDER — METOPROLOL TARTRATE 50 MG/1
50 TABLET ORAL 2 TIMES DAILY
Status: DISCONTINUED | OUTPATIENT
Start: 2018-11-06 | End: 2018-11-08 | Stop reason: HOSPADM

## 2018-11-06 RX ORDER — POTASSIUM CHLORIDE 750 MG/1
40 TABLET, FILM COATED, EXTENDED RELEASE ORAL
Status: COMPLETED | OUTPATIENT
Start: 2018-11-06 | End: 2018-11-06

## 2018-11-06 RX ORDER — LORAZEPAM 1 MG/1
1 TABLET ORAL
Status: DISCONTINUED | OUTPATIENT
Start: 2018-11-06 | End: 2018-11-08 | Stop reason: HOSPADM

## 2018-11-06 RX ORDER — IPRATROPIUM BROMIDE AND ALBUTEROL SULFATE 2.5; .5 MG/3ML; MG/3ML
3 SOLUTION RESPIRATORY (INHALATION)
Status: DISCONTINUED | OUTPATIENT
Start: 2018-11-06 | End: 2018-11-06

## 2018-11-06 RX ORDER — METOPROLOL TARTRATE 25 MG/1
25 TABLET, FILM COATED ORAL ONCE
Status: COMPLETED | OUTPATIENT
Start: 2018-11-06 | End: 2018-11-06

## 2018-11-06 RX ADMIN — METOPROLOL TARTRATE 25 MG: 25 TABLET ORAL at 09:27

## 2018-11-06 RX ADMIN — GUAIFENESIN 600 MG: 600 TABLET, EXTENDED RELEASE ORAL at 17:37

## 2018-11-06 RX ADMIN — APIXABAN 5 MG: 5 TABLET, FILM COATED ORAL at 17:37

## 2018-11-06 RX ADMIN — MELATONIN TAB 3 MG 9 MG: 3 TAB at 02:14

## 2018-11-06 RX ADMIN — IPRATROPIUM BROMIDE AND ALBUTEROL SULFATE 3 ML: .5; 3 SOLUTION RESPIRATORY (INHALATION) at 02:01

## 2018-11-06 RX ADMIN — CEFTRIAXONE 1 G: 1 INJECTION, POWDER, FOR SOLUTION INTRAMUSCULAR; INTRAVENOUS at 16:12

## 2018-11-06 RX ADMIN — ACETAMINOPHEN 650 MG: 325 TABLET ORAL at 02:26

## 2018-11-06 RX ADMIN — Medication 10 ML: at 05:47

## 2018-11-06 RX ADMIN — Medication 1 CAPSULE: at 09:27

## 2018-11-06 RX ADMIN — METOPROLOL TARTRATE 25 MG: 25 TABLET ORAL at 05:47

## 2018-11-06 RX ADMIN — LORAZEPAM 1 MG: 1 TABLET ORAL at 22:47

## 2018-11-06 RX ADMIN — ACETAMINOPHEN 650 MG: 325 TABLET ORAL at 09:36

## 2018-11-06 RX ADMIN — MELATONIN TAB 3 MG 9 MG: 3 TAB at 22:47

## 2018-11-06 RX ADMIN — INFLUENZA VIRUS VACCINE 0.5 ML: 15; 15; 15; 15 SUSPENSION INTRAMUSCULAR at 09:21

## 2018-11-06 RX ADMIN — GUAIFENESIN 600 MG: 600 TABLET, EXTENDED RELEASE ORAL at 09:27

## 2018-11-06 RX ADMIN — ONDANSETRON 4 MG: 2 INJECTION INTRAMUSCULAR; INTRAVENOUS at 09:27

## 2018-11-06 RX ADMIN — METOPROLOL TARTRATE 50 MG: 50 TABLET ORAL at 17:37

## 2018-11-06 RX ADMIN — Medication 10 ML: at 22:46

## 2018-11-06 RX ADMIN — POTASSIUM CHLORIDE 40 MEQ: 750 TABLET, FILM COATED, EXTENDED RELEASE ORAL at 09:26

## 2018-11-06 RX ADMIN — ONDANSETRON 4 MG: 2 INJECTION INTRAMUSCULAR; INTRAVENOUS at 17:38

## 2018-11-06 RX ADMIN — AZITHROMYCIN MONOHYDRATE 500 MG: 500 INJECTION, POWDER, LYOPHILIZED, FOR SOLUTION INTRAVENOUS at 15:00

## 2018-11-06 RX ADMIN — IPRATROPIUM BROMIDE AND ALBUTEROL SULFATE 3 ML: .5; 3 SOLUTION RESPIRATORY (INHALATION) at 07:27

## 2018-11-06 RX ADMIN — Medication 10 ML: at 15:03

## 2018-11-06 NOTE — CONSULTS
[address] [place], [address] [facility]     Date of  Admission: 11/5/2018 12:16 PM     Admission type:Emergency    Consult for:PAF  Consult by: hospitalist      Subjective:     Lovetta Klinefelter is a 59 y.o. female with PMH CKD, HTN, multiple myeloma who was admitted for HTN (hypertension). Per ED provider notes, Ms. Sarah Duran presented with c/o chest pain, SOB, n/v/d, and fevers for 3 days. She was admitted and is being treated for PNA and UTI. She developed  PAF in the early morning, prompting cardiology consult. On assessment, Ms. Sarah Duran states she's feeling better since admission. Breathing is improved, no chest pain. She had palpitations when she was in a-fib this morning and states that she sometimes has them at home as well - once every few months for a few minutes at a time. No leg swelling, orthopnea, PND, dizziness. Ms. Sarah Duran has never followed with a cardiologist and denies cardiac history. ECHO 12/10 with EF 60%.               Patient Active Problem List    Diagnosis Date Noted    Pneumonia 11/06/2018    UTI (urinary tract infection) 11/06/2018    PAF (paroxysmal atrial fibrillation) ([facility] [address] 11/06/2018    HTN (hypertension) 11/05/2018    S/P gastric bypass 11/04/2011    Multiple myeloma, without mention of having achieved remission 11/04/2011    Morbid obesity ([facility] [address] 04/12/2011    Epigastric abdominal pain 04/12/2011    Anemia, unspecified 02/17/2011    Multiple myeloma       Dalila Barriga MD  Past Medical History:   Diagnosis Date    Cancer [facility])     multiple Myolomia    Chronic kidney disease     2010-dr tillman    Dyspepsia 4/11/07    Edema of both legs 4/11/07    FH: CAD (coronary artery disease)     pt denies cad    Gastric bypass status for obesity 2007    Count includes [facility]    Gastrointestinal disorder     gastric bypass , and unclers    Headache(784.0) 4/11/07    HTN (hypertension) 4/11/07    Joint pain 4/11/07    Morbid obesity ([address] 4/11/07    Multiple myeloma     dr Daniella Wynn- diagnosed 2010    Multiple myeloma, without mention of having achieved remission 11/4/2011    Perforated viscus 7/18/2014    S/P gastric bypass 11/4/2011    Sleep apnea 4/11/07    Stool color black       Social History     Socioeconomic History    Marital status:      Spouse name: Not on file    Number of children: Not on file    Years of education: Not on file    Highest education level: Not on file   Social Needs    Financial resource strain: Not on file    Food insecurity - worry: Not on file    Food insecurity - inability: Not on file   [employer] needs - medical: Not on file   [employer] needs - non-medical: Not on file   Occupational History    Not on file   Tobacco Use    Smoking status: Never Smoker    Smokeless tobacco: Never Used   Substance and Sexual Activity    Alcohol use: No    Drug use: Not on file    Sexual activity: Not on file   Other Topics Concern    Not on file   Social History Narrative    Not on file     Allergies   Allergen Reactions    Bactrim [Sulfamethoprim Ds] Nausea and Vomiting    Bactrim [Sulfamethoprim] Nausea and Vomiting      Family History   Problem Relation Age of Onset    Hypertension Mother     Diabetes Father     Heart Disease Father     Stroke Father     Breast Cancer Cousin 41        bilateral mastectomy      Current Facility-Administered Medications   Medication Dose Route Frequency    melatonin tablet 9 mg  9 mg Oral QHS PRN    metoprolol tartrate (LOPRESSOR) tablet 50 mg  50 mg Oral BID    levalbuterol (XOPENEX) nebulizer soln 1.25 mg/3 mL  1.25 mg Nebulization Q4H PRN    lactobac ac& pc-s.therm-b.anim (RICHMOND Q/RISAQUAD)  1 Cap Oral DAILY    LORazepam (ATIVAN) tablet 1 mg  1 mg Oral QHS PRN    guaiFENesin ER (MUCINEX) tablet 600 mg  600 mg Oral BID    ondansetron (ZOFRAN) injection 4 mg  4 mg IntraVENous Q4H PRN    sodium chloride (NS) flush 5-10 mL  5-10 mL IntraVENous Q8H    sodium chloride (NS) flush 5-10 mL  5-10 mL IntraVENous PRN    enoxaparin (LOVENOX) injection 40 mg  40 mg SubCUTAneous Q24H    cefTRIAXone (ROCEPHIN) 1 g in 0.9% sodium chloride (MBP/ADV) 50 mL  1 g IntraVENous Q24H    benzonatate (TESSALON) capsule 100 mg  100 mg Oral TID PRN    azithromycin (ZITHROMAX) 500 mg in 0.9% sodium chloride 250 mL ([id number])  500 mg IntraVENous Q24H    acetaminophen (TYLENOL) tablet 650 mg  650 mg Oral Q4H PRN        Review of Symptoms:   11 systems reviewed, negative other than as stated in the HPI        Objective:      Visit Vitals  /79 (BP 1 Location: Left arm, BP Patient Position: At rest)   Pulse 77   Temp 98.1 °F (36.7 °C)   Resp 18   Ht 5' 6\" (1.676 m)   Wt 63.2 kg (139 lb 5.3 oz)   SpO2 95%   BMI 22.49 kg/m²       Physical:   General: pleasant  female resting in bed in NAD  Heart: RRR, no m/S3/JVD   Lungs: coarse on R  Abdomen: Soft, +BS, NTND   Extremities: LE joseline +DP/PT, no edema   Neurologic: Grossly normal    Data Review:   Recent Labs     11/06/18 0223 11/05/18  1200   WBC 4.3 6.7   HGB 10.9* 12.3   HCT 33.1* 38.2   PLT 76* 107*     Recent Labs     11/06/18  0223 11/05/18  1200    136   K 3.1* 3.7   * 105   CO2 22 20*   * 107*   BUN 33* 34*   CREA 1.51* 1.62*   CA 7.6* 8.4*   MG 2.5*  --    ALB  --  3.1*   TBILI  --  0.8   SGOT  --  24   ALT  --  16       Recent Labs     11/05/18  1200   TROIQ <0.05         Intake/Output Summary (Last 24 hours) at 11/6/2018 1415  Last data filed at 11/5/2018 2130  Gross per 24 hour   Intake 300 ml   Output --   Net 300 ml        Cardiographics    Telemetry: SR-ST;  PAF in early morning   ECG: ST             A fib   Echocardiogram: last as above; repeat ordered  CXRAY: \"Right lower lobe and right upper lung patchy airspace disease. \"       Assessment:       Active Problems:    HTN (hypertension) (11/5/2018)      Pneumonia (11/6/2018)      UTI (urinary tract infection) (11/6/2018)      PAF ([facility]oxysmal atrial fibrillation) [facility] (11/6/2018)         Plan:     Carmencita Mckeon is a 59 y.o. female who is admitted for pneumonia and UTI and was identified to have paroxysmal atrial fibrillation on telemetry overnight. K 3.1  · PAF likely triggered by respiratory process. Patient endorses intermittent palpitations at home as well, however. · CHADSVASC=2. Discussed anticoagulation with the patient. She denies bleeding history and agrees with starting eliquis. · Discussed risks and benefits of anticoagulation, signs and symptoms of bleeding, and to call if any concerns. · Agree with change to metoprolol. · Treat underlying infections  · Obtain ECHO  · Continue to monitor    Thank you for consulting [facility]    Sanjeev Moss, ORTIZ  DNP, RN, AGAP-BC    Patient seen and examined by me with nurse practitioner Sanjeev Moss. I personally performed all components of the history, physical, and medical decision making and agree with the assessment and plan with minor modifications as noted. New onset paroxysmal atrial fibrillation with elevated chads score. Starting Eliquis and metoprolol. Repeat echo. Discussed risks and benefits of blood thinners with the patient and when to call us.

## 2018-11-06 NOTE — PROGRESS NOTES
ADULT PROTOCOL: JET AEROSOL ASSESSMENT    Patient  Sharonda Velazquez     59 y.o.   female     11/6/2018  2:55 AM    Breath Sounds Pre Procedure: Right Breath Sounds: Clear                               Left Breath Sounds: Clear    Breath Sounds Post Procedure: Right Breath Sounds: Clear                                 Left Breath Sounds: Clear    Breathing pattern: Pre procedure Breathing Pattern: Regular          Post procedure Breathing Pattern: Regular    Heart Rate: Pre procedure Pulse: 85           Post procedure Pulse: 86    Resp Rate: Pre procedure Respirations: 18           Post procedure Respirations: 18    Peak Flow: Pre bronchodilator   n/a          Post bronchodilator   n/a    Incentive Spirometry:   n/a      n/a    Cough: Pre procedure Cough: Non-productive               Post procedure Cough: Non-productive    Sputum: Pre procedure  n/a                 Post procedure  n/a    Oxygen: O2 Device: Nasal cannula   Flow rate (L/min) 2     Changed: NO    SpO2: Pre procedure SpO2: 98 %   with oxygen              Post procedure SpO2: 97 %  with oxygen    Nebulizer Therapy: Current medications Aerosolized Medications: DuoNeb      Changed: YES    Problem List:   Patient Active Problem List   Diagnosis Code    Multiple myeloma 203.0    Anemia, unspecified D64.9    Morbid obesity (HCC) E66.01    Epigastric abdominal pain R10.13    S/P gastric bypass Z98.84    Multiple myeloma, without mention of having achieved remission C90.00    HTN (hypertension) I10       Respiratory Therapist: Rufus Lee RT

## 2018-11-06 NOTE — PROGRESS NOTES
Patient was visited by a Spiritual Care Partner Volunteer in Cardiopulmonary Care Unit on 11/6/2018. Documented by Rev. Erica Stone, [address] [place] [address] Road paging service: [phone])

## 2018-11-06 NOTE — PROGRESS NOTES
RN called about patient with paroxysmal afib. Patient had about 10 minutes of afib with RVR and is now back in sinus tachycardia (for about the last 20 minutes). Patient's Lisinopril/HCTZ stopped and Metoprolol 25 mg BID ordered.

## 2018-11-06 NOTE — PROGRESS NOTES
Bedside shift change report given to Kira Maradiaga, RN (oncoming nurse) by [facility] [place], RN (offgoing nurse). Report included the following information Radha Salazar Notified Dr Flakito Matute is 3.1 and of afib w/RVR overnight, NSR now.    0755 Pt received duo-new and HR is now 120-130's. Will notify      1299 Paged  to notify HR is still 120's.    2899 Spoke with . She is placing orders for cardiology consult, adding dose of metoprolol, potassium, and adding on magnesium lab.

## 2018-11-06 NOTE — PROGRESS NOTES
Hospitalist Progress Note    NAME: Cristobal Mahoney   :  1960   MRN:  [id number]       Assessment / Plan:  Sepsis ( fever/tachycardia) POA due to CAP  -fever is down; + tachycardic   On 1 L O2  -O2 to keep corona >90%, wean as tolerated, assess for home O2 on DC  --Empiric AB: Zithromax + CTX day    BC NTD   -changing jet nebs to xopenex prn with Afib   -cxray: Right lower lobe and right upper lung patchy airspace disease. New onset Paroxysmal Afib / HTN   -HR ~ 120s, BP stable  Tele: PAfib and sinus tachy   -changing home ARB/HCTZ --> to metoprolol   -ECHO  -cardiology consult   -monitor on tele     UTI POA was r/o   -UC only with 20K gram negative     Hypokalemia  -supplement as needed  -follow Mg ( adding on to am labs)     Thrombocytopenia / Multiple Myeloma   -plt trending down ( likely due to sepsis)   -monitor closely     CKD stage III  -Cr at baseline ~ 1.4-1.6, stable  - monitor closely. Avoid nephrotoxic drugs, adjust all meds to GFR. Code status: Full  Prophylaxis: Lovenox     Baseline: independent   Recommended Disposition: Home w/Family hopefully 24-72 hr      Subjective:     Chief Complaint / Reason for Physician Visit: following pneumonia / PAfib / HTN   Fever - down to normal   Cough - + dry cough   Weakness - feeling better     Discussed with RN events overnight. Review of Systems:  Symptom Y/N Comments  Symptom Y/N Comments   Fever/Chills n   Chest Pain n    Poor Appetite    Edema     Cough y   Abdominal Pain     Sputum n   Joint Pain     SOB/HARTLEY y Better   \" difficult to take a deep breath\"  Pruritis/Rash     Nausea/vomit    Tolerating PT/OT     Diarrhea    Tolerating Diet     Constipation    Other       Could NOT obtain due to:      Objective:     VITALS:   Last 24hrs VS reviewed since prior progress note.  Most recent are:  Patient Vitals for the past 24 hrs:   Temp Pulse Resp BP SpO2   18 0722 98.5 °F (36.9 °C) 82 20 125/82 97 %   18 0502 -- -- -- 114/69 --   11/06/18 0500 -- (!) 148 20 (!) 128/100 --   11/06/18 0214 99.6 °F (37.6 °C) (!) 115 18 141/84 94 %   11/06/18 0201 -- -- -- -- 98 %   11/05/18 2321 99.4 °F (37.4 °C) (!) 107 20 122/74 95 %   11/05/18 2130 98.9 °F (37.2 °C) -- -- -- --   11/05/18 2041 -- -- -- -- 94 %   11/05/18 1945 99.3 °F (37.4 °C) 96 22 123/75 93 %   11/05/18 1900 99.1 °F (37.3 °C) -- -- -- 96 %   11/05/18 1805 (!) 101 °F (38.3 °C) (!) 104 22 137/89 91 %   11/05/18 1719 -- -- -- -- 93 %   11/05/18 1716 -- -- -- -- (!) 89 %   11/05/18 1700 99.1 °F (37.3 °C) (!) 115 24 105/49 92 %   11/05/18 1445 99.5 °F (37.5 °C) (!) 102 21 123/85 96 %   11/05/18 1345 -- (!) 102 24 (!) 140/94 96 %   11/05/18 1335 -- 96 25 -- 93 %   11/05/18 1226 -- (!) 113 16 (!) 150/94 92 %   11/05/18 1152 (!) 101.1 °F (38.4 °C) (!) 135 20 (!) 158/93 92 %       Intake/Output Summary (Last 24 hours) at 11/6/2018 0837  Last data filed at 11/5/2018 2130  Gross per 24 hour   Intake 300 ml   Output --   Net 300 ml        PHYSICAL EXAM:  General: WD, WN. Alert, cooperative, no acute distress    EENT:  EOMI. Anicteric sclerae. MMM  Resp:  Diminished BS R side, no wheezing or rales. No accessory muscle use  CV:  Regular  rhythm,  No edema  GI:  Soft, Non distended, Non tender.  +Bowel sounds  Neurologic:  Alert and oriented X 3, normal speech,   Psych:   Good insight. Not anxious nor agitated  Skin:  No rashes. No jaundice    Reviewed most current lab test results and cultures  YES  Reviewed most current radiology test results   YES  Review and summation of old records today    NO  Reviewed patient's current orders and MAR    YES  PMH/SH reviewed - no change compared to H&P  ________________________________________________________________________  Care Plan discussed with:    Comments   Patient y    Family      RN y    Care Manager     Consultant                        Multidiciplinary team rounds were held today with , nursing, pharmacist and clinical coordinator. Patient's plan of care was discussed; medications were reviewed and discharge planning was addressed. ________________________________________________________________________  Total NON critical care TIME:  35  Minutes    Total CRITICAL CARE TIME Spent:   Minutes non procedure based      Comments   >50% of visit spent in counseling and coordination of care     ________________________________________________________________________  Gertrude Cummings MD     Procedures: see electronic medical records for all procedures/Xrays and details which were not copied into this note but were reviewed prior to creation of Plan. LABS:  I reviewed today's most current labs and imaging studies.   Pertinent labs include:  Recent Labs     11/06/18 0223 11/05/18  1200   WBC 4.3 6.7   HGB 10.9* 12.3   HCT 33.1* 38.2   PLT 76* 107*     Recent Labs     11/06/18 0223 11/05/18  1200    136   K 3.1* 3.7   * 105   CO2 22 20*   * 107*   BUN 33* 34*   CREA 1.51* 1.62*   CA 7.6* 8.4*   ALB  --  3.1*   TBILI  --  0.8   SGOT  --  24   ALT  --  16       Signed: eGrtrude Cummings MD

## 2018-11-06 NOTE — PROGRESS NOTES
Reason for Admission:   Pneumonia                   RRAT Score:     3                Plan for utilizing home health:      [facility] offerred and declined by patient                    Likelihood of Readmission:  Low to mod                         Transition of Care Plan:    Follow up with PCP    CM met with patient to discuss discharge planning. Pt confirmed name and  as pt identifiers. Demographics confirmed. Emergency contact updated. Izabel Gilmore [phone]  Patient lives with  in a mobile home, 5 steps entry. 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 Drives. Independent prior to admission. DME - none  Preferred RX - [facility] on [address], [place]  HH/SNF - no previous experience. Care Management Interventions  PCP Verified by CM: Yes  Mode of Transport at Discharge: Other (see comment)(family)  Transition of Care Consult (CM Consult): Discharge Planning  Discharge Durable Medical Equipment: No  Physical Therapy Consult: No  Occupational Therapy Consult: No  Current Support Network: Lives with Spouse(mobile home with 5 steps entry )  Confirm Follow Up Transport: Self  Plan discussed with Pt/Family/Caregiver: Yes  Discharge Location  Discharge Placement: Home      Patient dx qualifies for [facility]. Patient has declined. Anticipate follow up with PCP. No other needs.  will provide transportation at time of discharge.     Javier Cabral RN CM  Ext [phone]

## 2018-11-06 NOTE — PROGRESS NOTES
0500: Pt presents with A-fib RVR, -170s. Pt stated. \" I can feel my heart beating really fast.\" pt also reported that this feeling has happened before in the past but shortly subsided after a few minutes. Pt denies CP and feeling SOB. VS obtained diastolic 307 repeat b/p obtained and WNL. EKG obtained and oncall physician notified. 2364: Pt converted back to ST HR 110s    0520: Spoke with Dr. Shreya Gotti via telephone to inform him of the change in pts condition. New orders in place. Will implement. 0600: Pt in NSR HR 80s-90s. No acute s/sm of distress noted. katerina continue to monitor.

## 2018-11-07 LAB
ANION GAP SERPL CALC-SCNC: 8 MMOL/L (ref 5–15)
BACTERIA SPEC CULT: ABNORMAL
BUN SERPL-MCNC: 33 MG/DL (ref 6–20)
BUN/CREAT SERPL: 22 (ref 12–20)
CALCIUM SERPL-MCNC: 7.6 MG/DL (ref 8.5–10.1)
CC UR VC: ABNORMAL
CHLORIDE SERPL-SCNC: 115 MMOL/L (ref 97–108)
CO2 SERPL-SCNC: 21 MMOL/L (ref 21–32)
CREAT SERPL-MCNC: 1.5 MG/DL (ref 0.55–1.02)
ERYTHROCYTE [DISTWIDTH] IN BLOOD BY AUTOMATED COUNT: 13.8 % (ref 11.5–14.5)
GLUCOSE SERPL-MCNC: 89 MG/DL (ref 65–100)
HCT VFR BLD AUTO: 30.3 % (ref 35–47)
HGB BLD-MCNC: 9.9 G/DL (ref 11.5–16)
MAGNESIUM SERPL-MCNC: 2.8 MG/DL (ref 1.6–2.4)
MCH RBC QN AUTO: 30.9 PG (ref 26–34)
MCHC RBC AUTO-ENTMCNC: 32.7 G/DL (ref 30–36.5)
MCV RBC AUTO: 94.7 FL (ref 80–99)
NRBC # BLD: 0 K/UL (ref 0–0.01)
NRBC BLD-RTO: 0 PER 100 WBC
PHOSPHATE SERPL-MCNC: 1.6 MG/DL (ref 2.6–4.7)
PLATELET # BLD AUTO: 91 K/UL (ref 150–400)
PMV BLD AUTO: 8.9 FL (ref 8.9–12.9)
POTASSIUM SERPL-SCNC: 4.2 MMOL/L (ref 3.5–5.1)
RBC # BLD AUTO: 3.2 M/UL (ref 3.8–5.2)
SERVICE CMNT-IMP: ABNORMAL
SODIUM SERPL-SCNC: 144 MMOL/L (ref 136–145)
WBC # BLD AUTO: 4 K/UL (ref 3.6–11)

## 2018-11-07 PROCEDURE — 84100 ASSAY OF PHOSPHORUS: CPT | Performed by: HOSPITALIST

## 2018-11-07 PROCEDURE — 80048 BASIC METABOLIC PNL TOTAL CA: CPT | Performed by: HOSPITALIST

## 2018-11-07 PROCEDURE — 74011250637 HC RX REV CODE- 250/637: Performed by: HOSPITALIST

## 2018-11-07 PROCEDURE — 74011250637 HC RX REV CODE- 250/637: Performed by: INTERNAL MEDICINE

## 2018-11-07 PROCEDURE — 74011000258 HC RX REV CODE- 258: Performed by: INTERNAL MEDICINE

## 2018-11-07 PROCEDURE — 74011250636 HC RX REV CODE- 250/636: Performed by: INTERNAL MEDICINE

## 2018-11-07 PROCEDURE — 93306 TTE W/DOPPLER COMPLETE: CPT

## 2018-11-07 PROCEDURE — 74011250636 HC RX REV CODE- 250/636: Performed by: HOSPITALIST

## 2018-11-07 PROCEDURE — 83735 ASSAY OF MAGNESIUM: CPT | Performed by: HOSPITALIST

## 2018-11-07 PROCEDURE — 74011250637 HC RX REV CODE- 250/637: Performed by: NURSE PRACTITIONER

## 2018-11-07 PROCEDURE — 85027 COMPLETE CBC AUTOMATED: CPT | Performed by: HOSPITALIST

## 2018-11-07 PROCEDURE — 65660000000 HC RM CCU STEPDOWN

## 2018-11-07 PROCEDURE — 36415 COLL VENOUS BLD VENIPUNCTURE: CPT | Performed by: HOSPITALIST

## 2018-11-07 RX ORDER — SODIUM,POTASSIUM PHOSPHATES 280-250MG
2 POWDER IN PACKET (EA) ORAL EVERY 4 HOURS
Status: COMPLETED | OUTPATIENT
Start: 2018-11-07 | End: 2018-11-07

## 2018-11-07 RX ADMIN — CEFTRIAXONE 1 G: 1 INJECTION, POWDER, FOR SOLUTION INTRAMUSCULAR; INTRAVENOUS at 14:27

## 2018-11-07 RX ADMIN — ACETAMINOPHEN 650 MG: 325 TABLET ORAL at 09:12

## 2018-11-07 RX ADMIN — Medication 10 ML: at 16:36

## 2018-11-07 RX ADMIN — ONDANSETRON 4 MG: 2 INJECTION INTRAMUSCULAR; INTRAVENOUS at 17:45

## 2018-11-07 RX ADMIN — BENZONATATE 100 MG: 100 CAPSULE ORAL at 09:12

## 2018-11-07 RX ADMIN — LORAZEPAM 1 MG: 1 TABLET ORAL at 21:16

## 2018-11-07 RX ADMIN — APIXABAN 5 MG: 5 TABLET, FILM COATED ORAL at 09:11

## 2018-11-07 RX ADMIN — APIXABAN 5 MG: 5 TABLET, FILM COATED ORAL at 18:53

## 2018-11-07 RX ADMIN — GUAIFENESIN 600 MG: 600 TABLET, EXTENDED RELEASE ORAL at 18:53

## 2018-11-07 RX ADMIN — AZITHROMYCIN MONOHYDRATE 500 MG: 500 INJECTION, POWDER, LYOPHILIZED, FOR SOLUTION INTRAVENOUS at 16:29

## 2018-11-07 RX ADMIN — METOPROLOL TARTRATE 50 MG: 50 TABLET ORAL at 18:53

## 2018-11-07 RX ADMIN — POTASSIUM & SODIUM PHOSPHATES POWDER PACK 280-160-250 MG 2 PACKET: 280-160-250 PACK at 18:56

## 2018-11-07 RX ADMIN — Medication 10 ML: at 05:43

## 2018-11-07 RX ADMIN — Medication 10 ML: at 21:17

## 2018-11-07 RX ADMIN — GUAIFENESIN 600 MG: 600 TABLET, EXTENDED RELEASE ORAL at 09:11

## 2018-11-07 RX ADMIN — POTASSIUM & SODIUM PHOSPHATES POWDER PACK 280-160-250 MG 1 PACKET: 280-160-250 PACK at 21:15

## 2018-11-07 RX ADMIN — Medication 1 CAPSULE: at 09:11

## 2018-11-07 RX ADMIN — METOPROLOL TARTRATE 50 MG: 50 TABLET ORAL at 09:11

## 2018-11-07 RX ADMIN — MELATONIN TAB 3 MG 9 MG: 3 TAB at 21:17

## 2018-11-07 NOTE — PROGRESS NOTES
Hospitalist Progress Note    NAME: Melissa Heck   :  1960   MRN:  [id number]       Assessment / Plan:  Sepsis ( fever/tachycardia) POA resolved due to CAP  -fever/tachycardia - resolved    Off O2   Check O2 with ambulation prior to dc   --Empiric AB: Zithromax + CTX day 3   BC NTD   -cont xopenex prn with Afib   -cxray: Right lower lobe and right upper lung patchy airspace disease. New onset Paroxysmal Afib / HTN   -HR controlled now, no more recurrent Afib, BP stable  -changed home ARB/HCTZ --> to metoprolol   -ECHO: pending   -cardiology help appreciated: CHADSVASC=2. Discussed anticoagulation with the patient. She denies bleeding history and agrees with starting eliquis. Agreed with BB   -monitor on tele     Hypokalemia / hypophosphatemia   -supplement as needed  -monitor    Thrombocytopenia / Multiple Myeloma   -plt stable   -will need to be watched more closely with starting Eliquis  Pt will have appointment with Dr Renetta Aponte  and discuss further     CKD stage III  -Cr at baseline ~ 1.4-1.6, stable  - monitor closely. Avoid nephrotoxic drugs, adjust all meds to GFR. UTI POA was r/o . UC only with 20K gram negative       Code status: Full  Prophylaxis: Eliquis      Baseline: independent   Recommended Disposition: Home w/Family hopefully in am      Subjective:     Chief Complaint / Reason for Physician Visit: following pneumonia / PAfib / HTN   Fever - down to normal   Cough - + dry cough   Weakness - feeling better     Discussed with RN events overnight.      Review of Systems:  Symptom Y/N Comments  Symptom Y/N Comments   Fever/Chills n   Chest Pain n    Poor Appetite    Edema     Cough y   Abdominal Pain     Sputum n   Joint Pain     SOB/HARTLEY y Better   \" difficult to take a deep breath\"  Pruritis/Rash     Nausea/vomit    Tolerating PT/OT     Diarrhea    Tolerating Diet     Constipation    Other       Could NOT obtain due to:      Objective:     VITALS:   Last 24hrs VS reviewed since prior progress note. Most recent are:  Patient Vitals for the past 24 hrs:   Temp Pulse Resp BP SpO2   11/07/18 1526 99 °F (37.2 °C) 68 20 143/78 97 %   11/07/18 1128 98.3 °F (36.8 °C) 61 20 121/74 97 %   11/07/18 0823 98.4 °F (36.9 °C) 86 20 120/69 96 %   11/07/18 0324 98.8 °F (37.1 °C) 71 20 105/60 95 %   11/06/18 2255 97.7 °F (36.5 °C) 71 20 138/85 99 %   11/06/18 2248 98.9 °F (37.2 °C) 73 16 128/70 95 %   11/06/18 1833 97.9 °F (36.6 °C) 72 19 120/83 100 %       Intake/Output Summary (Last 24 hours) at 11/7/2018 1732  Last data filed at 11/7/2018 1422  Gross per 24 hour   Intake 680 ml   Output --   Net 680 ml        PHYSICAL EXAM:  General: WD, WN. Alert, cooperative, no acute distress    EENT:  EOMI. Anicteric sclerae. MMM  Resp:  Diminished BS R side, no wheezing or rales. No accessory muscle use  CV:  Regular  rhythm,  No edema  GI:  Soft, Non distended, Non tender.  +Bowel sounds  Neurologic:  Alert and oriented X 3, normal speech,   Psych:   Good insight. Not anxious nor agitated  Skin:  No rashes. No jaundice    Reviewed most current lab test results and cultures  YES  Reviewed most current radiology test results   YES  Review and summation of old records today    NO  Reviewed patient's current orders and MAR    YES  PMH/SH reviewed - no change compared to H&P  ________________________________________________________________________  Care Plan discussed with:    Comments   Patient y    Family      RN y    Care Manager     Consultant                        Multidiciplinary team rounds were held today with , nursing, pharmacist and clinical coordinator. Patient's plan of care was discussed; medications were reviewed and discharge planning was addressed.      ________________________________________________________________________  Total NON critical care TIME:  35  Minutes    Total CRITICAL CARE TIME Spent:   Minutes non procedure based      Comments   >50% of visit spent in counseling and coordination of care     ________________________________________________________________________  Ej Estes MD     Procedures: see electronic medical records for all procedures/Xrays and details which were not copied into this note but were reviewed prior to creation of Plan. LABS:  I reviewed today's most current labs and imaging studies.   Pertinent labs include:  Recent Labs     11/07/18 0530 11/06/18 0223 11/05/18  1200   WBC 4.0 4.3 6.7   HGB 9.9* 10.9* 12.3   HCT 30.3* 33.1* 38.2   PLT 91* 76* 107*     Recent Labs     11/07/18 0530 11/06/18 0223 11/05/18  1200    140 136   K 4.2 3.1* 3.7   * 109* 105   CO2 21 22 20*   GLU 89 109* 107*   BUN 33* 33* 34*   CREA 1.50* 1.51* 1.62*   CA 7.6* 7.6* 8.4*   MG 2.8* 2.5*  --    PHOS 1.6*  --   --    ALB  --   --  3.1*   TBILI  --   --  0.8   SGOT  --   --  24   ALT  --   --  16       Signed: jE Estes MD

## 2018-11-07 NOTE — PROGRESS NOTES
[address] [place] [address]  [phone]      Cardiology Progress Note      11/7/2018 9:40 AM    Admit Date: 11/5/2018    Admit Diagnosis:   HTN (hypertension)    Subjective:     Aaliyah Hilario is a 59 y.o. female with PMH CKD, HTN, multiple myeloma who was admitted for HTN (hypertension). Overnight events:  -no further a fib  -VSS  -creat steady at 1.50;H/H steady;  K 4.2  -Ms. Lashawn Prieto states she's feeling better today. Her breathing has improved and she has more energy. No chest pain or palpitations.      Visit Vitals  /69 (BP 1 Location: Right arm, BP Patient Position: Sitting)   Pulse 86   Temp 98.4 °F (36.9 °C)   Resp 20   Ht 5' 6\" (1.676 m)   Wt 63.2 kg (139 lb 5.3 oz)   SpO2 96%   BMI 22.49 kg/m²       Current Facility-Administered Medications   Medication Dose Route Frequency    melatonin tablet 9 mg  9 mg Oral QHS PRN    metoprolol tartrate (LOPRESSOR) tablet 50 mg  50 mg Oral BID    levalbuterol (XOPENEX) nebulizer soln 1.25 mg/3 mL  1.25 mg Nebulization Q4H PRN    lactobac ac& pc-s.therm-b.anim (RICHMOND Q/RISAQUAD)  1 Cap Oral DAILY    LORazepam (ATIVAN) tablet 1 mg  1 mg Oral QHS PRN    guaiFENesin ER (MUCINEX) tablet 600 mg  600 mg Oral BID    ondansetron (ZOFRAN) injection 4 mg  4 mg IntraVENous Q4H PRN    apixaban (ELIQUIS) tablet 5 mg  5 mg Oral BID    sodium chloride (NS) flush 5-10 mL  5-10 mL IntraVENous Q8H    sodium chloride (NS) flush 5-10 mL  5-10 mL IntraVENous PRN    cefTRIAXone (ROCEPHIN) 1 g in 0.9% sodium chloride (MBP/ADV) 50 mL  1 g IntraVENous Q24H    benzonatate (TESSALON) capsule 100 mg  100 mg Oral TID PRN    azithromycin (ZITHROMAX) 500 mg in 0.9% sodium chloride 250 mL (Nsyu5Dro)  500 mg IntraVENous Q24H    acetaminophen (TYLENOL) tablet 650 mg  650 mg Oral Q4H PRN       Objective:      Physical Exam:  General: pleasant  female resting in bed in NAD  Heart: RRR, no m/S3/JVD   Lungs: coarse on RLL  Abdomen: Soft, +BS, NTND   Extremities: LE joseline +DP/PT, no edema   Neurologic: Grossly normal  Skin:  Warm and dry. scattered ecchymosis BUE     Data Review:   Recent Labs     11/07/18  0530 11/06/18 0223 11/05/18  1200   WBC 4.0 4.3 6.7   HGB 9.9* 10.9* 12.3   HCT 30.3* 33.1* 38.2   PLT 91* 76* 107*     Recent Labs     11/07/18  0530 11/06/18 0223 11/05/18  1200    140 136   K 4.2 3.1* 3.7   * 109* 105   CO2 21 22 20*   GLU 89 109* 107*   BUN 33* 33* 34*   CREA 1.50* 1.51* 1.62*   CA 7.6* 7.6* 8.4*   MG 2.8* 2.5*  --    PHOS 1.6*  --   --    ALB  --   --  3.1*   TBILI  --   --  0.8   SGOT  --   --  24   ALT  --   --  16       Recent Labs     11/05/18  1200   TROIQ <0.05         Intake/Output Summary (Last 24 hours) at 11/7/2018 1040  Last data filed at 11/6/2018 2000  Gross per 24 hour   Intake 620 ml   Output --   Net 620 ml        Telemetry: SR   ECG: ST             A fib   Echocardiogram: ordered  CXRAY: \"Right lower lobe and right upper lung patchy airspace disease. \"    Assessment:     Active Problems:    HTN (hypertension) (11/5/2018)      Pneumonia (11/6/2018)      UTI (urinary tract infection) (11/6/2018)      PAF (paroxysmal atrial fibrillation) ([facility] [address] (11/6/2018)        Plan:     PAF: resolved. In setting of UTI/PNA. · No additional a fib on tele since addition of BB  · Continue BB  · Continue eliquis  · Await ECHO read      Kee Alvarado NP  DNP, RN, [facility]    Patient seen and examined by me with nurse practitioner Kee Alvarado. I personally performed all components of the history, physical, and medical decision making and agree with the assessment and plan with minor modifications as noted. Discussed risks and benefits of anticoagulation, signs and symptoms of bleeding, and to call if any concerns.

## 2018-11-08 ENCOUNTER — HOME HEALTH ADMISSION (OUTPATIENT)
Dept: HOME HEALTH SERVICES | Facility: HOME HEALTH | Age: 58
End: 2018-11-08

## 2018-11-08 VITALS
OXYGEN SATURATION: 95 % | HEIGHT: 66 IN | HEART RATE: 63 BPM | TEMPERATURE: 99 F | DIASTOLIC BLOOD PRESSURE: 77 MMHG | BODY MASS INDEX: 22.39 KG/M2 | RESPIRATION RATE: 12 BRPM | WEIGHT: 139.33 LBS | SYSTOLIC BLOOD PRESSURE: 134 MMHG

## 2018-11-08 LAB
ANION GAP SERPL CALC-SCNC: 9 MMOL/L (ref 5–15)
BUN SERPL-MCNC: 36 MG/DL (ref 6–20)
BUN/CREAT SERPL: 25 (ref 12–20)
CALCIUM SERPL-MCNC: 7.5 MG/DL (ref 8.5–10.1)
CHLORIDE SERPL-SCNC: 115 MMOL/L (ref 97–108)
CO2 SERPL-SCNC: 20 MMOL/L (ref 21–32)
CREAT SERPL-MCNC: 1.45 MG/DL (ref 0.55–1.02)
ERYTHROCYTE [DISTWIDTH] IN BLOOD BY AUTOMATED COUNT: 14 % (ref 11.5–14.5)
GLUCOSE SERPL-MCNC: 87 MG/DL (ref 65–100)
HCT VFR BLD AUTO: 29.7 % (ref 35–47)
HGB BLD-MCNC: 9.6 G/DL (ref 11.5–16)
MCH RBC QN AUTO: 30.6 PG (ref 26–34)
MCHC RBC AUTO-ENTMCNC: 32.3 G/DL (ref 30–36.5)
MCV RBC AUTO: 94.6 FL (ref 80–99)
NRBC # BLD: 0 K/UL (ref 0–0.01)
NRBC BLD-RTO: 0 PER 100 WBC
PHOSPHATE SERPL-MCNC: 2.7 MG/DL (ref 2.6–4.7)
PLATELET # BLD AUTO: 111 K/UL (ref 150–400)
PMV BLD AUTO: 8.9 FL (ref 8.9–12.9)
POTASSIUM SERPL-SCNC: 4.3 MMOL/L (ref 3.5–5.1)
RBC # BLD AUTO: 3.14 M/UL (ref 3.8–5.2)
SODIUM SERPL-SCNC: 144 MMOL/L (ref 136–145)
WBC # BLD AUTO: 4.2 K/UL (ref 3.6–11)

## 2018-11-08 PROCEDURE — 74011250637 HC RX REV CODE- 250/637: Performed by: INTERNAL MEDICINE

## 2018-11-08 PROCEDURE — 85027 COMPLETE CBC AUTOMATED: CPT

## 2018-11-08 PROCEDURE — 80048 BASIC METABOLIC PNL TOTAL CA: CPT

## 2018-11-08 PROCEDURE — 74011250636 HC RX REV CODE- 250/636: Performed by: HOSPITALIST

## 2018-11-08 PROCEDURE — 74011250637 HC RX REV CODE- 250/637: Performed by: HOSPITALIST

## 2018-11-08 PROCEDURE — 74011250637 HC RX REV CODE- 250/637: Performed by: NURSE PRACTITIONER

## 2018-11-08 PROCEDURE — 36415 COLL VENOUS BLD VENIPUNCTURE: CPT

## 2018-11-08 PROCEDURE — 84100 ASSAY OF PHOSPHORUS: CPT

## 2018-11-08 RX ORDER — AZITHROMYCIN 250 MG/1
TABLET, FILM COATED ORAL
Qty: 2 TAB | Refills: 0 | Status: SHIPPED | OUTPATIENT
Start: 2018-11-08 | End: 2018-11-13

## 2018-11-08 RX ORDER — METOPROLOL TARTRATE 50 MG/1
50 TABLET ORAL 2 TIMES DAILY
Qty: 60 TAB | Refills: 11 | Status: SHIPPED
Start: 2018-11-08 | End: 2020-01-15

## 2018-11-08 RX ORDER — AMOXICILLIN AND CLAVULANATE POTASSIUM 875; 125 MG/1; MG/1
1 TABLET, FILM COATED ORAL 2 TIMES DAILY
Qty: 8 TAB | Refills: 0 | Status: SHIPPED | OUTPATIENT
Start: 2018-11-08 | End: 2018-11-12

## 2018-11-08 RX ORDER — GUAIFENESIN 600 MG/1
600 TABLET, EXTENDED RELEASE ORAL 2 TIMES DAILY
Qty: 10 TAB | Refills: 0 | Status: SHIPPED | OUTPATIENT
Start: 2018-11-08 | End: 2018-11-13

## 2018-11-08 RX ORDER — BENZONATATE 100 MG/1
100 CAPSULE ORAL
Qty: 20 CAP | Refills: 0 | Status: SHIPPED | OUTPATIENT
Start: 2018-11-08 | End: 2018-11-15

## 2018-11-08 RX ORDER — METOPROLOL TARTRATE 50 MG/1
50 TABLET ORAL 2 TIMES DAILY
Qty: 60 TAB | Refills: 1 | Status: SHIPPED | OUTPATIENT
Start: 2018-11-08 | End: 2018-11-08

## 2018-11-08 RX ORDER — ONDANSETRON 4 MG/1
4 TABLET, ORALLY DISINTEGRATING ORAL
Qty: 15 TAB | Refills: 0 | Status: SHIPPED | OUTPATIENT
Start: 2018-11-08 | End: 2020-01-15

## 2018-11-08 RX ADMIN — Medication 10 ML: at 06:26

## 2018-11-08 RX ADMIN — GUAIFENESIN 600 MG: 600 TABLET, EXTENDED RELEASE ORAL at 10:07

## 2018-11-08 RX ADMIN — ONDANSETRON 4 MG: 2 INJECTION INTRAMUSCULAR; INTRAVENOUS at 07:52

## 2018-11-08 RX ADMIN — METOPROLOL TARTRATE 50 MG: 50 TABLET ORAL at 10:07

## 2018-11-08 RX ADMIN — APIXABAN 5 MG: 5 TABLET, FILM COATED ORAL at 10:07

## 2018-11-08 RX ADMIN — Medication 1 CAPSULE: at 10:07

## 2018-11-08 RX ADMIN — ACETAMINOPHEN 650 MG: 325 TABLET ORAL at 04:45

## 2018-11-08 NOTE — PROGRESS NOTES
Oxygen level sitting 95% Room air  Oxygen level standing 94% Room air  Oxygen level ambulating maintained between 95%-96%

## 2018-11-08 NOTE — DISCHARGE INSTRUCTIONS
Patient Discharge Instructions    Francesco Cardona / [id number] : 1960    Admitted 2018 Discharged: 2018         DISCHARGE DIAGNOSIS:           Pneumonia - complete antibiotics       PAF (paroxysmal atrial fibrillation)    - started on Eliquis = blood thinner to decrease risk of stroke     - metoprolol = control heart rate   -follow with cardiology Dr Clifford Nye     Multiple Myeloma - follow with Dr Deana Gil and discuss use of eliquis          Take Home Medications     You are being discharge on antibiotic Zithromax and Augmentin for treatment of pneumonia      What you should know about antibiotics:     Antibiotics are medicines that help people fight infections caused by bacteria. They work by killing bacteria that are in the body. Antibiotics can cause side effects, such as nausea, vomiting. Nausea is a common side effect of many antibiotics. It is not an allergic reaction. If you are a woman and you get a yeast infection after taking an antibiotic, that does not mean you are allergic to it. Yeast infections are a common side effect of antibiotics. One of the most important side effect to watch while taking antibiotic or after you just finished taking it is diarrhea. This type of diarrhea may be caused by an infection with bacteria called C. difficile. C. difficile normally lives in the intestines. When people are on antibiotics, the C. difficile in their intestines can overgrow and cause infection. If you develop any side effect especially diarrhea while taking antibiotics it is very important to contact your doctor. We recommend taking probiotics while taking antibiotics. Probiotics can be bought over the counter. Allergies to antibiotics are common. You can develop an allergy to an antibiotic, even if you have not had a problem with it before. Symptoms of antibiotic allergy can be mild and include a flat rash and itching.  They can also be more serious and include:      -----  Hives - are raised, red patches of skin that are usually very itchy      -----  Lip or tongue swelling     ------ Trouble swallowing or breathing  Serious allergy symptoms can start right after you start taking an antibiotic if you are very sensitive. Less serious symptoms, on the other hand, often start a day or more later. If you think you are allergic to antibiotics tell your doctor. General drug facts     If you have a very bad allergy, wear an allergy ID at all times. It is important that you take the medication exactly as they are prescribed. Keep your medication in the bottles provided by the pharmacist.  Keep a list of all your drugs (prescription, natural products, vitamins, OTC) with you. Give this list to your doctor. Do not take other medications without consulting your doctor. Do not share your drugs with others and do not take anyone else's drugs. Keep all drugs out of the reach of children and pets. Most drugs may be thrown away in household trash after mixing with coffee grounds or marcial litter and sealing in a plastic bag. Keep a list Call your doctor for help with any side effects. If in the [place] you may also call the FDA at 7-741-FDA-9379    Talk with the doctor before starting any new drug, including OTC, natural products, or vitamins. What to do at Home    1. Recommended diet: regular     2. Recommended activity: Activity as tolerated; fall precautions/ no heavy machinery use and no go up high due to being on blood thinner     3. If you experience any of the following symptoms then please call your primary care physician or return to the emergency room if you cannot get hold of your doctor:    4. Bring these papers with you to your follow up appointments.  The papers will help your doctors be sure to continue the care plan from the hospital.      Follow-up with:   PCP: Sterling Garcia MD  Follow-up Information     Follow up With Specialties Details Why Contact Aurora Martin MD Internal Medicine In 1 week  [address] [facility]      Ramírez Faustin MD Cardiology, Cardio Vascular Surgery, Internal Medicine In 1 month cardiology  [address]  [phone]             Please call for your own appointment        Information obtained by :  I understand that if any problems occur once I am at home I am to contact my physician. I understand and acknowledge receipt of the instructions indicated above.                                                                                                                                            Physician's or R.N.'s Signature                                                                  Date/Time                                                                                                                                              Patient or Representative Signature                                                          Date/Time

## 2018-11-08 NOTE — PROGRESS NOTES
[address] [place] [address] [address] [facility]  [phone]      Cardiology Progress Note      11/8/2018 1030 AM    Admit Date: 11/5/2018    Admit Diagnosis:   HTN (hypertension)    Subjective:     Kashif Pathak is a 59 y.o. female with PMH CKD, HTN, multiple myeloma who was admitted for HTN (hypertension). Overnight events:  -no further a fib  -VSS  -creat steady at 1.45;  -Ms. Oliverio Cheung states she's continuing to feel better. She is up for discharge. No chest pain or palpitations.      Visit Vitals  /77 (BP 1 Location: Left arm, BP Patient Position: Supine)   Pulse 63   Temp 99 °F (37.2 °C)   Resp 12   Ht 5' 6\" (1.676 m)   Wt 63.2 kg (139 lb 5.3 oz)   SpO2 95%   BMI 22.49 kg/m²       Current Facility-Administered Medications   Medication Dose Route Frequency    melatonin tablet 9 mg  9 mg Oral QHS PRN    metoprolol tartrate (LOPRESSOR) tablet 50 mg  50 mg Oral BID    levalbuterol (XOPENEX) nebulizer soln 1.25 mg/3 mL  1.25 mg Nebulization Q4H PRN    lactobac ac& pc-s.therm-b.anim (RICHMOND Q/RISAQUAD)  1 Cap Oral DAILY    LORazepam (ATIVAN) tablet 1 mg  1 mg Oral QHS PRN    guaiFENesin ER (MUCINEX) tablet 600 mg  600 mg Oral BID    ondansetron (ZOFRAN) injection 4 mg  4 mg IntraVENous Q4H PRN    apixaban (ELIQUIS) tablet 5 mg  5 mg Oral BID    sodium chloride (NS) flush 5-10 mL  5-10 mL IntraVENous Q8H    sodium chloride (NS) flush 5-10 mL  5-10 mL IntraVENous PRN    cefTRIAXone (ROCEPHIN) 1 g in 0.9% sodium chloride (MBP/ADV) 50 mL  1 g IntraVENous Q24H    benzonatate (TESSALON) capsule 100 mg  100 mg Oral TID PRN    azithromycin (ZITHROMAX) 500 mg in 0.9% sodium chloride 250 mL (Dtql1Urn)  500 mg IntraVENous Q24H    acetaminophen (TYLENOL) tablet 650 mg  650 mg Oral Q4H PRN       Objective:      Physical Exam:  General: pleasant  female resting in bed in NAD  Heart: RRR, no m/S3/JVD   Lungs: coarse rhonchi bilateral bases  Abdomen: Soft, +BS, NTND   Extremities: LE joseline +DP/PT, no edema   Neurologic: Grossly normal  Skin:  Warm and dry. scattered ecchymosis BUE     Data Review:   Recent Labs     11/08/18  0430 11/07/18  0530 11/06/18 0223   WBC 4.2 4.0 4.3   HGB 9.6* 9.9* 10.9*   HCT 29.7* 30.3* 33.1*   * 91* 76*     Recent Labs     11/08/18  0430 11/07/18  0530 11/06/18  0223 11/05/18  1200    144 140 136   K 4.3 4.2 3.1* 3.7   * 115* 109* 105   CO2 20* 21 22 20*   GLU 87 89 109* 107*   BUN 36* 33* 33* 34*   CREA 1.45* 1.50* 1.51* 1.62*   CA 7.5* 7.6* 7.6* 8.4*   MG  --  2.8* 2.5*  --    PHOS 2.7 1.6*  --   --    ALB  --   --   --  3.1*   TBILI  --   --   --  0.8   SGOT  --   --   --  24   ALT  --   --   --  16       Recent Labs     11/05/18  1200   TROIQ <0.05         Intake/Output Summary (Last 24 hours) at 11/8/2018 1113  Last data filed at 11/7/2018 1943  Gross per 24 hour   Intake 540 ml   Output --   Net 540 ml        Telemetry: SR   ECG: ST             A fib   Echocardiogram: EF 55-60%; mild hypertrophy, MR, and TR  CXRAY: \"Right lower lobe and right upper lung patchy airspace disease. \"    Assessment:     Active Problems:    HTN (hypertension) (11/5/2018)      Pneumonia (11/6/2018)      UTI (urinary tract infection) (11/6/2018)      PAF (paroxysmal atrial fibrillation) ([facility] [address] (11/6/2018)        Plan:     PAF: resolved. In setting of UTI/PNA. · No additional a fib on tele since addition of BB  · Continue BB and eliquis  · ECHO without new concerns      Patient being discharged this morning. Should follow up at our office in ~1 month. She has discount cards for eliquis. Onur Valle NP  DNP, RN, AGACNP-BC       Patient seen and examined by me with nurse practitioner Onur Valle. I personally performed all components of the history, physical, and medical decision making and agree with the assessment and plan with minor modifications as noted. Acute respiratory illness/UTI continues to improve. No more atrial fibrillation.   Tolerating Eliquis without evidence of bleeding. Given discount cards for Eliquis. Advised to follow-up with me within a month. Thanks for the consult. I appreciate the opportunity to participate in this patient's care.

## 2018-11-08 NOTE — PROGRESS NOTES
Per consult, CM sent a referral for a [facility] visit. When CM explained [facility] visit to patient, patient said she did not need it. CM encouraged patient to accept [facility] visit when they contact her to schedule. Patient is being discharged home today without any needs or concerns. Care Management Interventions  PCP Verified by CM: Yes  Mode of Transport at Discharge:  Other (see comment)(family)  Transition of Care Consult (CM Consult): Home Health([facility])  [address]: Yes  Discharge Durable Medical Equipment: No  Physical Therapy Consult: No  Occupational Therapy Consult: No  Current Support Network: Lives with Spouse(mobile home with 5 steps entry )  Confirm Follow Up Transport: Self  Plan discussed with Pt/Family/Caregiver: Yes  Discharge Location  Discharge Placement: Home with home health([facility])      Brennan Mays  Ext [phone]

## 2018-11-08 NOTE — DISCHARGE SUMMARY
Hospitalist Discharge Summary     Patient ID:  Cristobal Mahoney  [id number]  89 y.o.  1960    PCP on record: Dominique Sprague MD    Admit date: 11/5/2018  Discharge date and time: 11/8/2018      DISCHARGE DIAGNOSIS:    Sepsis ( fever/tachycardia) POA resolved due to CAP  New onset Paroxysmal Afib   HTN   Hypokalemia   hypophosphatemia   Thrombocytopenia / Multiple Myeloma   CKD stage III  UTI POA was r/o   Code status: Full        CONSULTATIONS:  IP CONSULT TO CARDIOLOGY    Excerpted HPI from H&P of Sanjana Deal MD:      Cristobal Mahoney is a 59 y.o. female with PMH, CKD, HTN, gastric bypass, who presents ambulatory to the ED because cough, fevers for the past 4 days. She went to an urgent care last Saturday she was tested for the flu (neg) despite that she got tamiflu, no ABX, her cough, sob got worse and she developed fever, for all this she decided to come today for further eval and management.         We were asked to admit for work up and evaluation of the above problems.          ______________________________________________________________________  DISCHARGE SUMMARY/HOSPITAL COURSE:  for full details see H&P, daily progress notes, labs, consult notes. Sepsis ( fever/tachycardia) POA resolved due to CAP  -fever/tachycardia - resolved    Off O2. sats stable at rest and with ambulation on RA   --Empiric AB: Zithromax + CTX day 4/ 7 ( 1400) --> Zithromax x 2 days + Augemntin x 4 days    BC NTD   -cxray: Right lower lobe and right upper lung patchy airspace disease.     New onset Paroxysmal Afib / HTN   -remain in NSR.  BP stable  -changed home ARB/HCTZ --> to metoprolol   -ECHO: EF 55%, mild MR, mild TR, mild concentric hypertrophy   -cardiology help appreciated: CHADSVASC=2.  Discussed anticoagulation with the patient. Radha Feldman denies bleeding history and agrees with starting eliquis.       Hypokalemia / hypophosphatemia   -supplemented as needed  -normal today    Thrombocytopenia / Multiple Myeloma   -plt stable   -will need to be watched more closely with starting Eliquis  Pt will have appointment with Dr Claudia Butler 11/8 and discuss further      CKD stage III  -Cr at baseline ~ 1.4-1.6, stable  - monitor closely. Avoid nephrotoxic drugs, adjust all meds to GFR.      UTI POA was r/o . UC only with 20K gram negative         Code status: Full  Prophylaxis: Eliquis       Baseline: independent   Recommended Disposition: Home w/Family. H2H visit         _______________________________________________________________________  Patient seen and examined by me on discharge day. General:          WD, WN. Alert, cooperative, no acute distress    EENT:              EOMI. Anicteric sclerae. MMM  Resp:               Bronchial sound R side, no wheezing or rales. No accessory muscle use  CV:                  Regular  rhythm,  No edema  GI:                   Soft, Non distended, Non tender.  +Bowel sounds  Neurologic:      Alert and oriented X 3, normal speech,   Psych:             Good insight. Not anxious nor agitated  Skin:                No rashes. No jaundice         _______________________________________________________________________  DISCHARGE MEDICATIONS:   Current Discharge Medication List      START taking these medications    Details   apixaban (ELIQUIS) 5 mg tablet Take 1 Tab by mouth two (2) times a day. Qty: 60 Tab, Refills: 0      benzonatate (TESSALON) 100 mg capsule Take 1 Cap by mouth three (3) times daily as needed for Cough for up to 7 days. Qty: 20 Cap, Refills: 0      guaiFENesin ER (MUCINEX) 600 mg ER tablet Take 1 Tab by mouth two (2) times a day for 5 days. Qty: 10 Tab, Refills: 0      L. acidoph & paracasei- S therm- Bifido (RICHMOND-Q/RISAQUAD) 8 billion cell cap cap Take 1 Cap by mouth daily. Qty: 20 Cap, Refills: 0      metoprolol tartrate (LOPRESSOR) 50 mg tablet Take 1 Tab by mouth two (2) times a day.   Qty: 60 Tab, Refills: 1      azithromycin (ZITHROMAX) 250 mg tablet Take 250 mg PO daily with dinner  Qty: 2 Tab, Refills: 0      amoxicillin-clavulanate (AUGMENTIN) 875-125 mg per tablet Take 1 Tab by mouth two (2) times a day for 4 days. Qty: 8 Tab, Refills: 0      ondansetron (ZOFRAN ODT) 4 mg disintegrating tablet Take 1 Tab by mouth every eight (8) hours as needed for Nausea. Qty: 15 Tab, Refills: 0         CONTINUE these medications which have NOT CHANGED    Details   LORazepam (ATIVAN) 1 mg tablet Take 1 mg by mouth nightly as needed for Anxiety. acetaminophen (TYLENOL) 325 mg tablet Take 650 mg by mouth daily as needed for Pain. STOP taking these medications       oseltamivir (TAMIFLU) 75 mg capsule Comments:   Reason for Stopping:         lisinopril-hydrochlorothiazide (PRINZIDE, ZESTORETIC) 20-12.5 mg per tablet Comments:   Reason for Stopping:               My Recommended Diet, Activity, Wound Care, and follow-up labs are listed in the patient's Discharge Insturctions which I have personally completed and reviewed.     ______________________________________________________________________    Risk of deterioration: low     Condition at Discharge:  Stable  ______________________________________________________________________    Disposition: home with family   ______________________________________________________________________    Care Plan discussed with:   Patient, RN, Care Manager    ______________________________________________________________________    Code Status: Full Code  ______________________________________________________________________      Follow up with:   PCP : Maryann Gómez MD  Follow-up Information     Follow up With Specialties Details Why Kristen Sheth MD Internal Medicine In 1 week  [address] [facility]      Marilu Lucero MD Cardiology, [address] Vascular Surgery, Internal Medicine In 1 month cardiology  [address] [facility] [address] [facility]  [phone]                Total time in minutes spent coordinating this discharge (includes going over instructions, follow-up, prescriptions, and preparing report for sign off to her PCP) :  > 30 minutes    Signed:  Belton Olszewski, MD

## 2018-11-08 NOTE — PROGRESS NOTES
Hospitalist Progress Note    NAME: Cristobal Mahoney   :  1960   MRN:  [id number]       Assessment / Plan:  Sepsis ( fever/tachycardia) POA resolved due to CAP  -fever/tachycardia - resolved    Off O2. sats stable at rest and with ambulation on RA   --Empiric AB: Zithromax + CTX day  ( 1400) --> Zithromax x 2 days + Augemntin x 4 days    BC NTD   -cxray: Right lower lobe and right upper lung patchy airspace disease. New onset Paroxysmal Afib / HTN   -remain in NSR. BP stable  -changed home ARB/HCTZ --> to metoprolol   -ECHO: EF 55%, mild MR, mild TR, mild concentric hypertrophy   -cardiology help appreciated: CHADSVASC=2. Discussed anticoagulation with the patient. She denies bleeding history and agrees with starting eliquis. Hypokalemia / hypophosphatemia   -supplemented as needed  -normal today     Thrombocytopenia / Multiple Myeloma   -plt stable   -will need to be watched more closely with starting Eliquis  Pt will have appointment with Dr Carter Ryder  and discuss further     CKD stage III  -Cr at baseline ~ 1.4-1.6, stable  - monitor closely. Avoid nephrotoxic drugs, adjust all meds to GFR. UTI POA was r/o . UC only with 20K gram negative       Code status: Full  Prophylaxis: Eliquis      Baseline: independent   Recommended Disposition: Home w/Family today ; H2H visit      Subjective:     Chief Complaint / Reason for Physician Visit: following pneumonia / PAfib / HTN    Cough - + dry cough, getting better   Overall doing much better     Discussed with RN events overnight. Review of Systems:  Symptom Y/N Comments  Symptom Y/N Comments   Fever/Chills n   Chest Pain n    Poor Appetite    Edema     Cough y Better   Abdominal Pain     Sputum n   Joint Pain     SOB/HARTLEY n   Pruritis/Rash     Nausea/vomit    Tolerating PT/OT     Diarrhea    Tolerating Diet     Constipation    Other       Could NOT obtain due to:      Objective:     VITALS:   Last 24hrs VS reviewed since prior progress note. Most recent are:  Patient Vitals for the past 24 hrs:   Temp Pulse Resp BP SpO2   11/08/18 0753 98.7 °F (37.1 °C) 73 12 140/80 94 %   11/08/18 0355 98.8 °F (37.1 °C) 70 20 117/76 95 %   11/07/18 1918 98.6 °F (37 °C) 85 20 135/81 97 %   11/07/18 1526 99 °F (37.2 °C) 68 20 143/78 97 %   11/07/18 1128 98.3 °F (36.8 °C) 61 20 121/74 97 %       Intake/Output Summary (Last 24 hours) at 11/8/2018 0926  Last data filed at 11/7/2018 1943  Gross per 24 hour   Intake 540 ml   Output --   Net 540 ml        PHYSICAL EXAM:  General: WD, WN. Alert, cooperative, no acute distress    EENT:  EOMI. Anicteric sclerae. MMM  Resp:  Bronchial sound R side, no wheezing or rales. No accessory muscle use  CV:  Regular  rhythm,  No edema  GI:  Soft, Non distended, Non tender.  +Bowel sounds  Neurologic:  Alert and oriented X 3, normal speech,   Psych:   Good insight. Not anxious nor agitated  Skin:  No rashes. No jaundice    Reviewed most current lab test results and cultures  YES  Reviewed most current radiology test results   YES  Review and summation of old records today    NO  Reviewed patient's current orders and MAR    YES  PMH/ reviewed - no change compared to H&P  ________________________________________________________________________  Care Plan discussed with:    Comments   Patient y    Family      RN y    Care Manager     Consultant                        Multidiciplinary team rounds were held today with , nursing, pharmacist and clinical coordinator. Patient's plan of care was discussed; medications were reviewed and discharge planning was addressed.      ________________________________________________________________________  Total NON critical care TIME:  35  Minutes    Total CRITICAL CARE TIME Spent:   Minutes non procedure based      Comments   >50% of visit spent in counseling and coordination of care y Dc coordination and education ________________________________________________________________________  Cherri Joshi MD     Procedures: see electronic medical records for all procedures/Xrays and details which were not copied into this note but were reviewed prior to creation of Plan. LABS:  I reviewed today's most current labs and imaging studies.   Pertinent labs include:  Recent Labs     11/08/18 0430 11/07/18 0530 11/06/18 0223   WBC 4.2 4.0 4.3   HGB 9.6* 9.9* 10.9*   HCT 29.7* 30.3* 33.1*   * 91* 76*     Recent Labs     11/08/18 0430 11/07/18 0530 11/06/18 0223 11/05/18  1200    144 140 136   K 4.3 4.2 3.1* 3.7   * 115* 109* 105   CO2 20* 21 22 20*   GLU 87 89 109* 107*   BUN 36* 33* 33* 34*   CREA 1.45* 1.50* 1.51* 1.62*   CA 7.5* 7.6* 7.6* 8.4*   MG  --  2.8* 2.5*  --    PHOS 2.7 1.6*  --   --    ALB  --   --   --  3.1*   TBILI  --   --   --  0.8   SGOT  --   --   --  24   ALT  --   --   --  16       Signed: Cherri Joshi MD

## 2018-11-08 NOTE — PROGRESS NOTES
Attempted to make follow up appointment with Dr Selena Mckeon but office said Nurse will call patient with appointment

## 2018-11-09 ENCOUNTER — HOME CARE VISIT (OUTPATIENT)
Dept: HOME HEALTH SERVICES | Facility: HOME HEALTH | Age: 58
End: 2018-11-09

## 2018-11-10 LAB
BACTERIA SPEC CULT: NORMAL
SERVICE CMNT-IMP: NORMAL

## 2018-11-13 ENCOUNTER — APPOINTMENT (OUTPATIENT)
Dept: GENERAL RADIOLOGY | Age: 58
End: 2018-11-13
Attending: EMERGENCY MEDICINE
Payer: COMMERCIAL

## 2018-11-13 ENCOUNTER — HOSPITAL ENCOUNTER (EMERGENCY)
Age: 58
Discharge: HOME | End: 2018-11-13
Attending: EMERGENCY MEDICINE | Admitting: EMERGENCY MEDICINE
Payer: COMMERCIAL

## 2018-11-13 VITALS
WEIGHT: 141.54 LBS | OXYGEN SATURATION: 96 % | HEIGHT: 66 IN | SYSTOLIC BLOOD PRESSURE: 138 MMHG | HEART RATE: 68 BPM | DIASTOLIC BLOOD PRESSURE: 94 MMHG | BODY MASS INDEX: 22.75 KG/M2 | TEMPERATURE: 98.1 F | RESPIRATION RATE: 17 BRPM

## 2018-11-13 DIAGNOSIS — K62.5 RECTAL BLEEDING: Primary | ICD-10-CM

## 2018-11-13 DIAGNOSIS — R06.02 SOB (SHORTNESS OF BREATH): ICD-10-CM

## 2018-11-13 DIAGNOSIS — N28.9 RENAL INSUFFICIENCY: ICD-10-CM

## 2018-11-13 LAB
ABO + RH BLD: NORMAL
ALBUMIN SERPL-MCNC: 3.1 G/DL (ref 3.5–5)
ALBUMIN/GLOB SERPL: 0.9 {RATIO} (ref 1.1–2.2)
ALP SERPL-CCNC: 62 U/L (ref 45–117)
ALT SERPL-CCNC: 21 U/L (ref 12–78)
ANION GAP SERPL CALC-SCNC: 8 MMOL/L (ref 5–15)
AST SERPL-CCNC: 13 U/L (ref 15–37)
BASOPHILS # BLD: 0 K/UL (ref 0–0.1)
BASOPHILS NFR BLD: 1 % (ref 0–1)
BILIRUB SERPL-MCNC: 0.2 MG/DL (ref 0.2–1)
BLOOD GROUP ANTIBODIES SERPL: NORMAL
BNP SERPL-MCNC: 527 PG/ML (ref 0–125)
BUN SERPL-MCNC: 37 MG/DL (ref 6–20)
BUN/CREAT SERPL: 19 (ref 12–20)
CALCIUM SERPL-MCNC: 9 MG/DL (ref 8.5–10.1)
CHLORIDE SERPL-SCNC: 115 MMOL/L (ref 97–108)
CK SERPL-CCNC: 54 U/L (ref 26–192)
CO2 SERPL-SCNC: 20 MMOL/L (ref 21–32)
COMMENT, HOLDF: NORMAL
CREAT SERPL-MCNC: 1.9 MG/DL (ref 0.55–1.02)
DIFFERENTIAL METHOD BLD: ABNORMAL
EOSINOPHIL # BLD: 0.1 K/UL (ref 0–0.4)
EOSINOPHIL NFR BLD: 1 % (ref 0–7)
ERYTHROCYTE [DISTWIDTH] IN BLOOD BY AUTOMATED COUNT: 13.9 % (ref 11.5–14.5)
GLOBULIN SER CALC-MCNC: 3.6 G/DL (ref 2–4)
GLUCOSE SERPL-MCNC: 123 MG/DL (ref 65–100)
HCT VFR BLD AUTO: 34.6 % (ref 35–47)
HEMOCCULT STL QL: POSITIVE
HGB BLD-MCNC: 10.8 G/DL (ref 11.5–16)
IMM GRANULOCYTES # BLD: 0.1 K/UL (ref 0–0.04)
IMM GRANULOCYTES NFR BLD AUTO: 2 % (ref 0–0.5)
LYMPHOCYTES # BLD: 1.6 K/UL (ref 0.8–3.5)
LYMPHOCYTES NFR BLD: 26 % (ref 12–49)
MCH RBC QN AUTO: 30.1 PG (ref 26–34)
MCHC RBC AUTO-ENTMCNC: 31.2 G/DL (ref 30–36.5)
MCV RBC AUTO: 96.4 FL (ref 80–99)
MONOCYTES # BLD: 0.4 K/UL (ref 0–1)
MONOCYTES NFR BLD: 6 % (ref 5–13)
NEUTS SEG # BLD: 4 K/UL (ref 1.8–8)
NEUTS SEG NFR BLD: 65 % (ref 32–75)
NRBC # BLD: 0 K/UL (ref 0–0.01)
NRBC BLD-RTO: 0 PER 100 WBC
PLATELET # BLD AUTO: 224 K/UL (ref 150–400)
PMV BLD AUTO: 8.7 FL (ref 8.9–12.9)
POTASSIUM SERPL-SCNC: 4.6 MMOL/L (ref 3.5–5.1)
PROT SERPL-MCNC: 6.7 G/DL (ref 6.4–8.2)
RBC # BLD AUTO: 3.59 M/UL (ref 3.8–5.2)
SAMPLES BEING HELD,HOLD: NORMAL
SODIUM SERPL-SCNC: 143 MMOL/L (ref 136–145)
SPECIMEN EXP DATE BLD: NORMAL
TROPONIN I SERPL-MCNC: <0.05 NG/ML
WBC # BLD AUTO: 6.1 K/UL (ref 3.6–11)

## 2018-11-13 PROCEDURE — 84484 ASSAY OF TROPONIN QUANT: CPT

## 2018-11-13 PROCEDURE — 82550 ASSAY OF CK (CPK): CPT

## 2018-11-13 PROCEDURE — 36415 COLL VENOUS BLD VENIPUNCTURE: CPT

## 2018-11-13 PROCEDURE — 83880 ASSAY OF NATRIURETIC PEPTIDE: CPT

## 2018-11-13 PROCEDURE — 74011250637 HC RX REV CODE- 250/637: Performed by: PHYSICIAN ASSISTANT

## 2018-11-13 PROCEDURE — 82272 OCCULT BLD FECES 1-3 TESTS: CPT

## 2018-11-13 PROCEDURE — 80053 COMPREHEN METABOLIC PANEL: CPT

## 2018-11-13 PROCEDURE — 86901 BLOOD TYPING SEROLOGIC RH(D): CPT

## 2018-11-13 PROCEDURE — 99284 EMERGENCY DEPT VISIT MOD MDM: CPT

## 2018-11-13 PROCEDURE — 85025 COMPLETE CBC W/AUTO DIFF WBC: CPT

## 2018-11-13 RX ORDER — PANTOPRAZOLE SODIUM 40 MG/1
40 TABLET, DELAYED RELEASE ORAL DAILY
Qty: 20 TAB | Refills: 0 | Status: SHIPPED | OUTPATIENT
Start: 2018-11-13 | End: 2018-12-03

## 2018-11-13 RX ORDER — ACETAMINOPHEN 325 MG/1
650 TABLET ORAL
Status: COMPLETED | OUTPATIENT
Start: 2018-11-13 | End: 2018-11-13

## 2018-11-13 RX ADMIN — ACETAMINOPHEN 650 MG: 325 TABLET ORAL at 18:44

## 2018-11-14 NOTE — ED NOTES
Dr. Anita Wilkins reviewed discharge instructions with the patient and spouse. The patient and spouse verbalized understanding.

## 2018-11-14 NOTE — ED PROVIDER NOTES
EMERGENCY DEPARTMENT HISTORY AND PHYSICAL EXAM      Date: 11/13/2018  Patient Name: Cj Carr    History of Presenting Illness     Chief Complaint   Patient presents with    Melena     Pt reports black stool since Sunday, started on eliquis 1 week ago. History Provided By: Patient    HPI: Cj Carr, 59 y.o. female with PMHx significant for GERD, CAD, CKD and HTN, presents ambulatory to the ED with cc of sudden onset, constant blood in the stool x 2 days with associated lightheadedness, SOB, a productive cough and a HA. Blood in the stool is described as dark. Cough production is clear. MARTINEZ is a 5/10 located in the back of the head. Pt denies any trauma to the head. Pt states her symptoms started 2 days ago and persistence prompted the trip to the ED for evaluation. Pt does not take iron supplements. Pt is currently on Eliquis to treat a fib since last Tuesday. Pt denies smoking or drinking. Pt specifically denies abd pain, CP, fever, chills, numbness or tingling. There are no other complaints, changes, or physical findings at this time. PCP: Sterling Garcia MD    Current Outpatient Medications   Medication Sig Dispense Refill    pantoprazole (PROTONIX) 40 mg tablet Take 1 Tab by mouth daily for 20 days. 20 Tab 0    benzonatate (TESSALON) 100 mg capsule Take 1 Cap by mouth three (3) times daily as needed for Cough for up to 7 days. 20 Cap 0    guaiFENesin ER (MUCINEX) 600 mg ER tablet Take 1 Tab by mouth two (2) times a day for 5 days. 10 Tab 0    L. acidoph & paracasei- S therm- Bifido (RICHMOND-Q/RISAQUAD) 8 billion cell cap cap Take 1 Cap by mouth daily. 20 Cap 0    ondansetron (ZOFRAN ODT) 4 mg disintegrating tablet Take 1 Tab by mouth every eight (8) hours as needed for Nausea. 15 Tab 0    metoprolol tartrate (LOPRESSOR) 50 mg tablet Take 1 Tab by mouth two (2) times a day. 60 Tab 11    LORazepam (ATIVAN) 1 mg tablet Take 1 mg by mouth nightly as needed for Anxiety.       acetaminophen (TYLENOL) 325 mg tablet Take 650 mg by mouth daily as needed for Pain. Past History     Past Medical History:  Past Medical History:   Diagnosis Date    Cancer ([facility] [address]     multiple Myolomia    Chronic kidney disease     2010-dr tillman    Dyspepsia 4/11/07    Edema of both legs 4/11/07    FH: CAD (coronary artery disease)     pt denies cad    [facility]astric [facility] for obesity 2007    [facility]    Gastrointestinal disorder     gastric bypass , and unclers    Headache(784.0) 4/11/07    HTN (hypertension) 4/11/07    Joint pain 4/11/07    Morbid obesity ([address] 4/11/07    Multiple myeloma     dr Bonita Foreman- diagnosed 2010    Multiple myeloma, without mention of having achieved remission 11/4/2011    Perforated viscus 7/18/2014    S/P gastric bypass 11/4/2011    Sleep apnea 4/11/07    Stool color black        Past Surgical History:  Past Surgical History:   Procedure Laterality Date    HX GASTRIC BYPASS  7/9/07    GASTRIC BYPASS--MONTOYA    HX GYN  2001    hysterectomy    HX TOTAL ABDOMINAL HYSTERECTOMY  2002       Family History:  Family History   Problem Relation Age of Onset    Hypertension Mother     Diabetes Father     Heart Disease Father     Stroke Father     Breast Cancer Cousin 41        bilateral mastectomy       Social History:  Social History     Tobacco Use    Smoking status: Never Smoker    Smokeless tobacco: Never Used   Substance Use Topics    Alcohol use: No    Drug use: Not on file       Allergies: Allergies   Allergen Reactions    Bactrim [Sulfamethoprim Ds] Nausea and Vomiting    Bactrim [Sulfamethoprim] Nausea and Vomiting         Review of Systems   Review of Systems   Constitutional: Negative for chills and fever. HENT: Negative for congestion. Eyes: Negative. Respiratory: Positive for cough and shortness of breath. Cardiovascular: Negative for chest pain. Gastrointestinal: Positive for blood in stool.  Negative for abdominal pain.   Endocrine: Negative for heat intolerance. Musculoskeletal: Negative for back pain. Skin: Negative for rash. Allergic/Immunologic: Negative for immunocompromised state. Neurological: Positive for light-headedness and headaches. Negative for numbness. Hematological: Does not bruise/bleed easily. Psychiatric/Behavioral: Negative. All other systems reviewed and are negative. Physical Exam   Physical Exam   Constitutional: She appears well-developed and well-nourished. No distress. HENT:   Head: Normocephalic. Eyes: EOM are normal.   Eyes normal   Neck: Normal range of motion. Neck supple. Cardiovascular: Normal rate, regular rhythm, normal heart sounds and intact distal pulses. Pulmonary/Chest: Effort normal and breath sounds normal.   Abdominal: Soft. Bowel sounds are normal. There is tenderness (LLQ > RLQ). Musculoskeletal: Normal range of motion. She exhibits tenderness (cervical neck). Neurological: She is alert. Skin: Skin is warm and dry. Psychiatric: She has a normal mood and affect. Her behavior is normal.   Nursing note and vitals reviewed. Diagnostic Study Results     Labs -     Recent Results (from the past 12 hour(s))   CBC WITH AUTOMATED DIFF    Collection Time: 11/13/18  5:38 PM   Result Value Ref Range    WBC 6.1 3.6 - 11.0 K/uL    RBC 3.59 (L) 3.80 - 5.20 M/uL    HGB 10.8 (L) 11.5 - 16.0 g/dL    HCT 34.6 (L) 35.0 - 47.0 %    MCV 96.4 80.0 - 99.0 FL    MCH 30.1 26.0 - 34.0 PG    MCHC 31.2 30.0 - 36.5 g/dL    RDW 13.9 11.5 - 14.5 %    PLATELET 032 282 - 503 K/uL    MPV 8.7 (L) 8.9 - 12.9 FL    NRBC 0.0 0  WBC    ABSOLUTE NRBC 0.00 0.00 - 0.01 K/uL    NEUTROPHILS 65 32 - 75 %    LYMPHOCYTES 26 12 - 49 %    MONOCYTES 6 5 - 13 %    EOSINOPHILS 1 0 - 7 %    BASOPHILS 1 0 - 1 %    IMMATURE GRANULOCYTES 2 (H) 0.0 - 0.5 %    ABS. NEUTROPHILS 4.0 1.8 - 8.0 K/UL    ABS. LYMPHOCYTES 1.6 0.8 - 3.5 K/UL    ABS. MONOCYTES 0.4 0.0 - 1.0 K/UL    ABS.  EOSINOPHILS 0.1 0.0 - 0.4 K/UL    ABS. BASOPHILS 0.0 0.0 - 0.1 K/UL    ABS. IMM. GRANS. 0.1 (H) 0.00 - 0.04 K/UL    DF AUTOMATED     METABOLIC PANEL, COMPREHENSIVE    Collection Time: 11/13/18  5:38 PM   Result Value Ref Range    Sodium 143 136 - 145 mmol/L    Potassium 4.6 3.5 - 5.1 mmol/L    Chloride 115 (H) 97 - 108 mmol/L    CO2 20 (L) 21 - 32 mmol/L    Anion gap 8 5 - 15 mmol/L    Glucose 123 (H) 65 - 100 mg/dL    BUN 37 (H) 6 - 20 MG/DL    Creatinine 1.90 (H) 0.55 - 1.02 MG/DL    BUN/Creatinine ratio 19 12 - 20      GFR est AA 33 (L) >60 ml/min/1.73m2    GFR est non-AA 27 (L) >60 ml/min/1.73m2    Calcium 9.0 8.5 - 10.1 MG/DL    Bilirubin, total 0.2 0.2 - 1.0 MG/DL    ALT (SGPT) 21 12 - 78 U/L    AST (SGOT) 13 (L) 15 - 37 U/L    Alk. phosphatase 62 45 - 117 U/L    Protein, total 6.7 6.4 - 8.2 g/dL    Albumin 3.1 (L) 3.5 - 5.0 g/dL    Globulin 3.6 2.0 - 4.0 g/dL    A-G Ratio 0.9 (L) 1.1 - 2.2     TYPE & SCREEN    Collection Time: 11/13/18  5:38 PM   Result Value Ref Range    Crossmatch Expiration 11/16/2018     ABO/Rh(D) O POSITIVE     Antibody screen NEG    SAMPLES BEING HELD    Collection Time: 11/13/18  5:38 PM   Result Value Ref Range    SAMPLES BEING HELD 1blue     COMMENT        Add-on orders for these samples will be processed based on acceptable specimen integrity and analyte stability, which may vary by analyte.    CK    Collection Time: 11/13/18  5:38 PM   Result Value Ref Range    CK 54 26 - 192 U/L   TROPONIN I    Collection Time: 11/13/18  5:38 PM   Result Value Ref Range    Troponin-I, Qt. <0.05 <0.05 ng/mL   NT-PRO BNP    Collection Time: 11/13/18  5:38 PM   Result Value Ref Range    NT pro- (H) 0 - 125 PG/ML   OCCULT BLOOD, STOOL    Collection Time: 11/13/18 10:50 PM   Result Value Ref Range    Occult blood, stool POSITIVE (A) NEG         Radiologic Studies -   XR CHEST PA LAT    (Results Pending)     CXR Results  (Last 48 hours)    None            Medical Decision Making   I am the first provider for this patient. I reviewed the vital signs, available nursing notes, past medical history, past surgical history, family history and social history. Vital Signs-Reviewed the patient's vital signs. Patient Vitals for the past 12 hrs:   Temp Pulse Resp BP SpO2   11/13/18 2330 -- 68 17 (!) 138/94 96 %   11/13/18 2315 -- 65 -- 133/74 94 %   11/13/18 2300 -- 61 -- 126/76 94 %   11/13/18 2245 -- 71 -- 127/80 97 %   11/13/18 2230 -- (!) 57 21 131/78 92 %   11/13/18 2215 -- (!) 59 17 113/90 95 %   11/13/18 2200 -- 63 15 129/76 97 %   11/13/18 2153 -- -- -- -- 97 %   11/13/18 2151 -- -- -- 139/71 --   11/13/18 1719 98.1 °F (36.7 °C) 71 16 157/87 98 %       Pulse Oximetry Analysis - 98% on RA    Cardiac Monitor:   Rate: 71 bpm  Rhythm: Normal Sinus Rhythm     Records Reviewed: Nursing Notes and Old Medical Records    Provider Notes (Medical Decision Making):   DDx: medication side effect, GI bleed, gastritis, PUD, anemia, tension HA    ED Course:   Initial assessment performed. The patients presenting problems have been discussed, and they are in agreement with the care plan formulated and outlined with them. I have encouraged them to ask questions as they arise throughout their visit. Procedure Note - Rectal Exam:   10:45 PM  Performed by: Malachi Jones MD  Chaperoned by: RN  Rectal exam performed. Light brown stool was collected. Stool was collected and sent to the lab for Hemoccult testing. The procedure took 1-15 minutes, and pt tolerated well. Consult Note:  11:29 Arnie You MD spoke with Dr. Eliana Hagen,  Specialty: GI  Discussed pt's hx, disposition, and available diagnostic and imaging results. Reviewed care plans. Consultant agrees with plans as outlined. States pt can follow up out patient. 11:30 PM   Pt is ready for discharge. Critical Care Time:   0    Disposition:  DISCHARGE NOTE:  11:44 PM  The patient is ready for discharge.  The patients signs, symptoms, diagnosis, and instructions for discharge have been discussed and the pt has conveyed their understanding. The patient is to follow up as recommended or return to the ER should their symptoms worsen. Plan has been discussed and patient has conveyed their agreement. PLAN:  1. Discharge  Current Discharge Medication List      START taking these medications    Details   pantoprazole (PROTONIX) 40 mg tablet Take 1 Tab by mouth daily for 20 days. Qty: 20 Tab, Refills: 0           2. Follow-up Information     Follow up With Specialties Details Why Contact Info    Pete James MD Gastroenterology Call in 1 day  [address] [facility] [address] [facility] MD Jessica Internal Medicine  As needed [id number] [facility] [address]  [address] [facility] [address]  [address] [facility]  [phone]      [facility] EMERGENCY DEPT Emergency Medicine  If symptoms worsen [address]  [address] [facility]  [phone]        Return to ED if worse     Diagnosis     Clinical Impression:   1. Rectal bleeding    2. SOB (shortness of breath)    3. Renal insufficiency        Attestations: This note is prepared by Hilario Sapp, acting as Scribe for Peace Mina MD.    Peace Mina MD: The scribe's documentation has been prepared under my direction and personally reviewed by me in its entirety. I confirm that the note above accurately reflects all work, treatment, procedures, and medical decision making performed by me.

## 2018-11-14 NOTE — DISCHARGE INSTRUCTIONS
Rectal Bleeding: Care Instructions  Your Care Instructions    Rectal bleeding in small amounts is common. You may see red spotting on toilet paper or drops of blood in the toilet. Rectal bleeding has many possible causes, from something as minor as hemorrhoids to something as serious as colon cancer. You may need more tests to find the cause of your bleeding. Follow-up care is a key part of your treatment and safety. Be sure to make and go to all appointments, and call your doctor if you are having problems. It's also a good idea to know your test results and keep a list of the medicines you take. How can you care for yourself at home? · Avoid aspirin and other nonsteroidal anti-inflammatory drugs (NSAIDs), such as ibuprofen (Advil, Motrin) and naproxen (Aleve). They can cause you to bleed more. Ask your doctor if you can take acetaminophen (Tylenol). Read and follow all instructions on the label. · Use a stool softener that contains bran or psyllium. You can save money by buying bran or psyllium (available in bulk at most health food stores) and sprinkling it on foods or stirring it into fruit juice. You can also use a product such as Metamucil or Citrucel. · Take your medicines exactly as directed. Call your doctor if you think you are having a problem with your medicine. When should you call for help? Call 911 anytime you think you may need emergency care. For example, call if:    · You passed out (lost consciousness).    Call your doctor now or seek immediate medical care if:    · You have new or worse pain.     · You have new or worse bleeding from the rectum.     · You are dizzy or light-headed, or you feel like you may faint.    Watch closely for changes in your health, and be sure to contact your doctor if:    · You cannot pass stools or gas.     · You do not get better as expected. Where can you learn more? Go to http://susan-edis.info/.   Enter F566 in the search box to learn more about \"Rectal Bleeding: Care Instructions. \"  Current as of: March 28, 2018  Content Version: 11.8  © 3662-2014 [employer]. Care instructions adapted under license by [employer] (which disclaims liability or warranty for this information). If you have questions about a medical condition or this instruction, always ask your healthcare professional. [address] any warranty or liability for your use of this information. Shortness of Breath: Care Instructions  Your Care Instructions  Shortness of breath has many causes. Sometimes conditions such as anxiety can lead to shortness of breath. Some people get mild shortness of breath when they exercise. Trouble breathing also can be a symptom of a serious problem, such as asthma, lung disease, emphysema, heart problems, and pneumonia. If your shortness of breath continues, you may need tests and treatment. Watch for any changes in your breathing and other symptoms. Follow-up care is a key part of your treatment and safety. Be sure to make and go to all appointments, and call your doctor if you are having problems. It's also a good idea to know your test results and keep a list of the medicines you take. How can you care for yourself at home? · Do not smoke or allow others to smoke around you. If you need help quitting, talk to your doctor about stop-smoking programs and medicines. These can increase your chances of quitting for good. · Get plenty of rest and sleep. · Take your medicines exactly as prescribed. Call your doctor if you think you are having a problem with your medicine. · Find healthy ways to deal with stress. ? Exercise daily. ? Get plenty of sleep. ? Eat regularly and well. When should you call for help? Call 911 anytime you think you may need emergency care. For example, call if:    · You have severe shortness of breath.     · You have symptoms of a heart attack.  These may include:  ? Chest pain or pressure, or a strange feeling in the chest.  ? Sweating. ? Shortness of breath. ? Nausea or vomiting. ? Pain, pressure, or a strange feeling in the back, neck, jaw, or upper belly or in one or both shoulders or arms. ? Lightheadedness or sudden weakness. ? A fast or irregular heartbeat. After you call 911, the  may tell you to chew 1 adult-strength or 2 to 4 low-dose aspirin. Wait for an ambulance. Do not try to drive yourself.    Call your doctor now or seek immediate medical care if:    · Your shortness of breath gets worse or you start to wheeze. Wheezing is a high-pitched sound when you breathe.     · You wake up at night out of breath or have to prop your head up on several pillows to breathe.     · You are short of breath after only light activity or while at rest.    Watch closely for changes in your health, and be sure to contact your doctor if:    · You do not get better over the next 1 to 2 days. Where can you learn more? Go to http://susan-edis.info/. Enter S780 in the search box to learn more about \"Shortness of Breath: Care Instructions. \"  Current as of: December 6, 2017  Content Version: 11.8  © 9861-1083 [employer]. Care instructions adapted under license by [employer] (which disclaims liability or warranty for this information). If you have questions about a medical condition or this instruction, always ask your healthcare professional. [place] [address] any warranty or liability for your use of this information.

## 2018-11-17 ENCOUNTER — HOME CARE VISIT (OUTPATIENT)
Dept: HOME HEALTH SERVICES | Facility: HOME HEALTH | Age: 58
End: 2018-11-17

## 2018-11-23 ENCOUNTER — HOME CARE VISIT (OUTPATIENT)
Dept: HOME HEALTH SERVICES | Facility: HOME HEALTH | Age: 58
End: 2018-11-23

## 2018-12-05 ENCOUNTER — OFFICE VISIT (OUTPATIENT)
Dept: CARDIOLOGY CLINIC | Age: 58
End: 2018-12-05

## 2018-12-05 VITALS
DIASTOLIC BLOOD PRESSURE: 80 MMHG | WEIGHT: 148.2 LBS | HEIGHT: 66 IN | OXYGEN SATURATION: 99 % | SYSTOLIC BLOOD PRESSURE: 138 MMHG | BODY MASS INDEX: 23.82 KG/M2 | RESPIRATION RATE: 16 BRPM | HEART RATE: 55 BPM

## 2018-12-05 DIAGNOSIS — Z98.84 H/O GASTRIC BYPASS: ICD-10-CM

## 2018-12-05 DIAGNOSIS — K92.1 GASTROINTESTINAL HEMORRHAGE WITH MELENA: ICD-10-CM

## 2018-12-05 DIAGNOSIS — I48.0 PAF (PAROXYSMAL ATRIAL FIBRILLATION) (HCC): Primary | ICD-10-CM

## 2018-12-05 DIAGNOSIS — I10 ESSENTIAL HYPERTENSION: ICD-10-CM

## 2018-12-05 RX ORDER — DEXLANSOPRAZOLE 60 MG/1
CAPSULE, DELAYED RELEASE ORAL
Refills: 1 | COMMUNITY
Start: 2018-11-21 | End: 2020-01-15

## 2018-12-05 RX ORDER — APIXABAN 5 MG/1
TABLET, FILM COATED ORAL
Refills: 0 | COMMUNITY
Start: 2018-11-08

## 2018-12-05 RX ORDER — TRIAMCINOLONE ACETONIDE 1 MG/G
PASTE DENTAL
Refills: 0 | COMMUNITY
Start: 2018-11-30 | End: 2020-01-15

## 2018-12-05 RX ORDER — ACYCLOVIR 400 MG/1
TABLET ORAL
COMMUNITY
End: 2020-01-15

## 2018-12-05 NOTE — PROGRESS NOTES
2 [address], 200 [facility]  [phone]     Subjective:      Sharonda Velazquez is a 59 y.o. female is here for post hospital f/u where she was admitted 11/5/18 - 11/8/18 for PNA, new onset PAF. She was started on Eliquis for CVA risk reduction, went back to ED 11/13/18 for dark stool. In the ED, Hgb stable 10.8, Was started on Dexilant and has scheduled egd with GI. The patient denies chest pain/ shortness of breath, orthopnea, PND, LE edema, palpitations, syncope, or presyncope.        Patient Active Problem List    Diagnosis Date Noted    Gastrointestinal hemorrhage with melena 12/05/2018    H/O gastric bypass 12/05/2018    Pneumonia 11/06/2018    UTI (urinary tract infection) 11/06/2018    PAF (paroxysmal atrial fibrillation) ([address] 11/06/2018    HTN (hypertension) 11/05/2018    S/P gastric bypass 11/04/2011    Multiple myeloma, without mention of having achieved remission 11/04/2011    Morbid obesity ([address] 04/12/2011    Epigastric abdominal pain 04/12/2011    Anemia, unspecified 02/17/2011    Multiple myeloma       Kristy Chance MD  Past Medical History:   Diagnosis Date    Cancer [facility])     multiple Myolomia    Chronic kidney disease     2010-dr tillman    Dyspepsia 4/11/07    Edema of both legs 4/11/07    FH: CAD (coronary artery disease)     pt denies cad    Gastric bypass status for obesity 2007    [facility]    Gastrointestinal disorder     gastric bypass , and unclers    Headache(784.0) 4/11/07    HTN (hypertension) 4/11/07    Joint pain 4/11/07    Morbid obesity ([address] 4/11/07    Multiple myeloma     dr Soto Nine- diagnosed 2010    Multiple myeloma, without mention of having achieved remission 11/4/2011    Perforated viscus 7/18/2014    S/P gastric bypass 11/4/2011    Sleep apnea 4/11/07    Stool color black       Past Surgical History:   Procedure Laterality Date    HX GASTRIC BYPASS  7/9/07    GASTRIC BYPASS--MONTOYA    HX GYN  2001    hysterectomy    HX TOTAL ABDOMINAL HYSTERECTOMY  2002     Allergies   Allergen Reactions    Bactrim [Sulfamethoprim Ds] Nausea and Vomiting    Bactrim [Sulfamethoprim] Nausea and Vomiting      Family History   Problem Relation Age of Onset    Hypertension Mother     Diabetes Father     Heart Disease Father     Stroke Father     Breast Cancer Cousin 39        bilateral mastectomy      Social History     Socioeconomic History    Marital status:      Spouse name: Not on file    Number of children: Not on file    Years of education: Not on file    Highest education level: Not on file   Social Needs    Financial resource strain: Not on file    Food insecurity - worry: Not on file    Food insecurity - inability: Not on file   [employer] needs - medical: Not on file   [employer] needs - non-medical: Not on file   Occupational History    Not on file   Tobacco Use    Smoking status: Current Every Day Smoker     Types: Cigarettes    Smokeless tobacco: Never Used    Tobacco comment: smoked for 5 years and quit when she was 22   Substance and Sexual Activity    Alcohol use: No     Comment: once a month maybe    Drug use: No    Sexual activity: Not on file   Other Topics Concern    Not on file   Social History Narrative    Not on file      Current Outpatient Medications   Medication Sig    acyclovir (ZOVIRAX) 400 mg tablet acyclovir 400 mg tablet- as needed    ELIQUIS 5 mg tablet TAKE 1 TABLET BY MOUTH TWICE A DAY    DEXILANT 60 mg CpDB take 1 capsule by mouth once daily 30 MINUTES BEFORE BREAKFAST    triamcinolone acetonide (KENALOG) 0.1 % dental paste APPLY TO MOUTH ULCERS 3 TIMES A DAY    L. acidoph & paracasei- S therm- Bifido (RICHMOND-Q/RISAQUAD) 8 billion cell cap cap Take 1 Cap by mouth daily.  ondansetron (ZOFRAN ODT) 4 mg disintegrating tablet Take 1 Tab by mouth every eight (8) hours as needed for Nausea.     metoprolol tartrate (LOPRESSOR) 50 mg tablet Take 1 Tab by mouth two (2) times a day.  LORazepam (ATIVAN) 1 mg tablet Take 1 mg by mouth nightly as needed for Anxiety.  acetaminophen (TYLENOL) 325 mg tablet Take 650 mg by mouth daily as needed for Pain. No current facility-administered medications for this visit. Review of Symptoms:  11 systems reviewed, negative other than as stated in the HPI    Physical ExamPhysical Exam:    Vitals:    12/05/18 1534 12/05/18 1550   BP: 128/82 138/80   Pulse: (!) 55    Resp: 16    SpO2: 99%    Weight: 148 lb 3.2 oz (67.2 kg)    Height: 5' 6\" (1.676 m)      Body mass index is 23.92 kg/m². General PE   Gen:  NAD  Mental Status - Alert. General Appearance - Not in acute distress. Chest and Lung Exam   Inspection: Accessory muscles - No use of accessory muscles in breathing. Auscultation:   Breath sounds: - Normal.   Cardiovascular   Inspection: Jugular vein - Bilateral - Inspection Normal.   Palpation/Percussion:   Apical Impulse: - Normal.   Auscultation: Rhythm - Regular. Heart Sounds - S1 WNL and S2 WNL. No S3 or S4. Murmurs & Other Heart Sounds: Auscultation of the heart reveals - No Murmurs. Peripheral Vascular   Upper Extremity: Inspection - Bilateral - No Cyanotic nailbeds or Digital clubbing. Lower Extremity:   Palpation: Edema - Bilateral - No edema. Abdomen:   Soft, non-tender, bowel sounds are active.   Neuro: A&O times 3, CN and motor grossly WNL    Labs:   Lab Results   Component Value Date/Time    Cholesterol, total 191 04/10/2009 10:00 AM    HDL Cholesterol 55 04/10/2009 10:00 AM    LDL, calculated 116.4 (H) 04/10/2009 10:00 AM    Triglyceride 98 04/10/2009 10:00 AM    CHOL/HDL Ratio 3.5 04/10/2009 10:00 AM     Lab Results   Component Value Date/Time    CK 54 11/13/2018 05:38 PM     Lab Results   Component Value Date/Time    Sodium 143 11/13/2018 05:38 PM    Potassium 4.6 11/13/2018 05:38 PM    Chloride 115 (H) 11/13/2018 05:38 PM    CO2 20 (L) 11/13/2018 05:38 PM    Anion gap 8 11/13/2018 05:38 PM    Glucose 123 (H) 11/13/2018 05:38 PM    BUN 37 (H) 11/13/2018 05:38 PM    Creatinine 1.90 (H) 11/13/2018 05:38 PM    BUN/Creatinine ratio 19 11/13/2018 05:38 PM    GFR est AA 33 (L) 11/13/2018 05:38 PM    GFR est non-AA 27 (L) 11/13/2018 05:38 PM    Calcium 9.0 11/13/2018 05:38 PM    Bilirubin, total 0.2 11/13/2018 05:38 PM    AST (SGOT) 13 (L) 11/13/2018 05:38 PM    Alk. phosphatase 62 11/13/2018 05:38 PM    Protein, total 6.7 11/13/2018 05:38 PM    Albumin 3.1 (L) 11/13/2018 05:38 PM    Globulin 3.6 11/13/2018 05:38 PM    A-G Ratio 0.9 (L) 11/13/2018 05:38 PM    ALT (SGPT) 21 11/13/2018 05:38 PM       EKG:  NSR     Assessment:        1. PAF (paroxysmal atrial fibrillation) ([facility]    2. Essential hypertension    3. Gastrointestinal hemorrhage with melena    4. H/O gastric bypass        Orders Placed This Encounter    AMB POC EKG ROUTINE W/ 12 LEADS, INTER & REP     Order Specific Question:   Reason for Exam:     Answer:   routine    ECHO TTE STRESS COMP W OR WO CONTR     Standing Status:   Future     Standing Expiration Date:   6/7/2019     Order Specific Question:   Reason for Exam:     Answer:   CP and/ or SOB    acyclovir (ZOVIRAX) 400 mg tablet     Sig: acyclovir 400 mg tablet- as needed    ELIQUIS 5 mg tablet     Sig: TAKE 1 TABLET BY MOUTH TWICE A DAY     Refill:  0    DEXILANT 60 mg CpDB     Sig: take 1 capsule by mouth once daily 30 MINUTES BEFORE BREAKFAST     Refill:  1    triamcinolone acetonide (KENALOG) 0.1 % dental paste     Sig: APPLY TO MOUTH ULCERS 3 TIMES A DAY     Refill:  0        Plan: This is a 59 y.o. female is here for post hospital f/u where she was admitted 11/5/18 - 11/8/18 for PNA, new onset PAF. She was started on Eliquis for CVA risk reduction, went back to ED 11/13/18 for dark stool. In the ED, Hgb stable 10.8, Was started on Dexilant and has scheduled egd with GI.     New onset Paroxysmal Afib in the setting of PNA  Maintaining SR  ECHO: EF 55%, mild MR, mild TR, mild concentric hypertrophy   CHADSVASC=2.  Continue Eliquis 5 mg BID. May hold Eliquis 2 days prior to EGD, and restart after procedure if deemed safe by GI. Check stress echo to rule out ischemia for future antiarrhythmic options if necessary. Fortunately, the patient can tell when she is in atrial fibrillation, so she will keep us posted if any recurrence. Low threshold to discontinue Eliquis if recurrent melena for a longer period of time in light of situational A. fib in the setting of UTI.     HTN  Controlled with BB    Lipids and labs followed by PCP. Continue current care and f/u in 6 months, sooner as needed. She will keep us posted on endoscopy results and recommendations as well Dr. Laquita Vargas.     Lu Rodriguez MD

## 2018-12-05 NOTE — PROGRESS NOTES
1. Have you been to the ER, urgent care clinic since your last visit? Hospitalized since your last visit? Seen on 11/5/18 and 11/13 /18. 2. Have you seen or consulted any other health care providers outside of the [address] [place] [address] since your last visit? Include any pap smears or colon screening. Seen by  at .       Chief Complaint   Patient presents with   [facility] Follow Up     cardiac clearance for endo with -chest discomfort when going up [place]

## 2020-01-15 ENCOUNTER — OFFICE VISIT (OUTPATIENT)
Dept: CARDIOLOGY CLINIC | Age: 60
End: 2020-01-15

## 2020-01-15 VITALS
HEART RATE: 56 BPM | WEIGHT: 176.2 LBS | HEIGHT: 66 IN | OXYGEN SATURATION: 98 % | BODY MASS INDEX: 28.32 KG/M2 | DIASTOLIC BLOOD PRESSURE: 100 MMHG | SYSTOLIC BLOOD PRESSURE: 160 MMHG | RESPIRATION RATE: 16 BRPM

## 2020-01-15 DIAGNOSIS — I48.0 PAF (PAROXYSMAL ATRIAL FIBRILLATION) (HCC): Primary | ICD-10-CM

## 2020-01-15 DIAGNOSIS — Z98.84 H/O GASTRIC BYPASS: ICD-10-CM

## 2020-01-15 DIAGNOSIS — E66.01 MORBID OBESITY (HCC): ICD-10-CM

## 2020-01-15 DIAGNOSIS — R06.09 DOE (DYSPNEA ON EXERTION): ICD-10-CM

## 2020-01-15 DIAGNOSIS — I10 ESSENTIAL HYPERTENSION: ICD-10-CM

## 2020-01-15 RX ORDER — DOXAZOSIN 1 MG/1
TABLET ORAL
Refills: 3 | COMMUNITY
Start: 2019-10-13

## 2020-01-15 RX ORDER — SUCRALFATE 1 G/10ML
SUSPENSION ORAL
COMMUNITY

## 2020-01-15 RX ORDER — CONJUGATED ESTROGENS 0.62 MG/G
CREAM VAGINAL DAILY
COMMUNITY
Start: 2020-01-12

## 2020-01-15 RX ORDER — NEBIVOLOL 5 MG/1
5 TABLET ORAL DAILY
COMMUNITY

## 2020-01-15 RX ORDER — BUMETANIDE 1 MG/1
1 TABLET ORAL
COMMUNITY

## 2020-01-15 NOTE — PROGRESS NOTES
[address] [facility], [place], [address] [facility]  [phone]     Subjective:      Artis Felty is a 62 y.o. female is here for routine f/u on PAF HTN HLD  Her BP is elevated today and she reports its been running high for the last few months as high as 170/104. Her PCP started her on doxazosin 1 mg daily back in September 2019. She is also currently dealing with ear infection, followed by Dr Dennis Dominguez, ENT. In regards to her PAF, she denies no recurrent palpitation. She did not get her stress echo because of scheduling conflict    The patient denies chest pain/ shortness of breath, orthopnea, PND, LE edema, palpitations, syncope, or presyncope.        Patient Active Problem List    Diagnosis Date Noted    Anemia, unspecified 02/17/2011     Priority: 1 - One    Gastrointestinal hemorrhage with melena 12/05/2018    H/O gastric bypass 12/05/2018    Pneumonia 11/06/2018    UTI (urinary tract infection) 11/06/2018    PAF (paroxysmal atrial fibrillation) ([address] 11/06/2018    HTN (hypertension) 11/05/2018    S/P gastric bypass 11/04/2011    Multiple myeloma, without mention of having achieved remission 11/04/2011    Morbid obesity ([address] 04/12/2011    Epigastric abdominal pain 04/12/2011    Multiple myeloma       Lacy Montana MD  Past Medical History:   Diagnosis Date    Cancer [facility])     multiple Myolomia    Chronic kidney disease     2010-dr tillman    Dyspepsia 4/11/07    Edema of both legs 4/11/07    FH: CAD (coronary artery disease)     pt denies cad    Gastric bypass status for obesity 2007    [facility]    Gastrointestinal disorder     gastric bypass , and unclers    Headache(784.0) 4/11/07    HTN (hypertension) 4/11/07    Joint pain 4/11/07    Morbid obesity ([address] 4/11/07    Multiple myeloma     dr Lul Olson- diagnosed 2010    Multiple myeloma, without mention of having achieved remission 11/4/2011    Perforated viscus 7/18/2014    S/P gastric bypass 11/4/2011    Sleep apnea 4/11/07    Stool color black       Past Surgical History:   Procedure Laterality Date    HX GASTRIC BYPASS  7/9/07    GASTRIC BYPASS--MONTOYA    HX GYN  2001    hysterectomy    HX TOTAL ABDOMINAL HYSTERECTOMY  2002     Allergies   Allergen Reactions    Bactrim [Sulfamethoprim Ds] Nausea and Vomiting    Bactrim [Sulfamethoprim] Nausea and Vomiting      Family History   Problem Relation Age of Onset    Hypertension Mother     Diabetes Father     Heart Disease Father     Stroke Father     Breast Cancer Cousin 39        bilateral mastectomy      Social History     Socioeconomic History    Marital status:      Spouse name: Not on file    Number of children: Not on file    Years of education: Not on file    Highest education level: Not on file   Occupational History    Not on file   Social Needs    Financial resource strain: Not on file    Food insecurity:     Worry: Not on file     Inability: Not on file    Transportation needs:     Medical: Not on file     Non-medical: Not on file   Tobacco Use    Smoking status: Former Smoker     Types: Cigarettes    Smokeless tobacco: Never Used    Tobacco comment: quit 30 years ago   Substance and Sexual Activity    Alcohol use: Yes     Frequency: Monthly or less     Comment: once a month maybe    Drug use: No    Sexual activity: Not on file   Lifestyle    Physical activity:     Days per week: Not on file     Minutes per session: Not on file    Stress: Not on file   Relationships    Social connections:     Talks on phone: Not on file     Gets together: Not on file     Attends Voodoo service: Not on file     Active member of club or organization: Not on file     Attends meetings of clubs or organizations: Not on file     Relationship status: Not on file    Intimate partner violence:     Fear of current or ex partner: Not on file     Emotionally abused: Not on file     Physically abused: Not on file     Forced sexual activity: Not on file   Other Topics Concern    Not on file   Social History Narrative    Not on file      Current Outpatient Medications   Medication Sig    sucralfate (CARAFATE) 100 mg/mL suspension daily as needed.  nebivolol (BYSTOLIC) 5 mg tablet Take 5 mg by mouth daily.  PREMARIN 0.625 mg/gram vaginal cream Apply  to affected area daily.  doxazosin (CARDURA) 1 mg tablet TAKE 1 TABLET BY MOUTH AT BEDTIME    bumetanide (BUMEX) 1 mg tablet Take 1 mg by mouth. Every other day    ELIQUIS 5 mg tablet TAKE 1 TABLET BY MOUTH TWICE A DAY    LORazepam (ATIVAN) 1 mg tablet Take 1 mg by mouth nightly as needed for Anxiety.  acetaminophen (TYLENOL) 325 mg tablet Take 650 mg by mouth daily as needed for Pain. No current facility-administered medications for this visit. Review of Symptoms:  11 systems reviewed, negative other than as stated in the HPI    Physical ExamPhysical Exam:    Vitals:    01/15/20 1516 01/15/20 1537   BP: 150/90 (!) 160/100   Pulse: (!) 56    Resp: 16    SpO2: 98%    Weight: 176 lb 3.2 oz (79.9 kg)    Height: 5' 6\" (1.676 m)      Body mass index is 28.44 kg/m². General PE  Gen:  NAD  Mental Status - Alert. General Appearance - Not in acute distress. HEENT:  PERRL, no carotid bruits or JVD  Chest and Lung Exam   Inspection: Accessory muscles - No use of accessory muscles in breathing. Auscultation:   Breath sounds: - Normal.   Cardiovascular   Inspection: Jugular vein - Bilateral - Inspection Normal.   Palpation/Percussion:   Apical Impulse: - Normal.   Auscultation: Rhythm - Regular. Heart Sounds - S1 WNL and S2 WNL. No S3 or S4. Murmurs & Other Heart Sounds: Auscultation of the heart reveals - No Murmurs. Peripheral Vascular   Upper Extremity: Inspection - Bilateral - No Cyanotic nailbeds or Digital clubbing. Lower Extremity:   Palpation: Edema - Bilateral - No edema. Abdomen:   Soft, non-tender, bowel sounds are active.   Neuro: A&O times 3, CN and motor grossly WNL    Labs:   Lab Results   Component Value Date/Time    Cholesterol, total 191 04/10/2009 10:00 AM    HDL Cholesterol 55 04/10/2009 10:00 AM    LDL, calculated 116.4 (H) 04/10/2009 10:00 AM    Triglyceride 98 04/10/2009 10:00 AM    CHOL/HDL Ratio 3.5 04/10/2009 10:00 AM     Lab Results   Component Value Date/Time    CK 54 11/13/2018 05:38 PM     Lab Results   Component Value Date/Time    Sodium 143 11/13/2018 05:38 PM    Potassium 4.6 11/13/2018 05:38 PM    Chloride 115 (H) 11/13/2018 05:38 PM    CO2 20 (L) 11/13/2018 05:38 PM    Anion gap 8 11/13/2018 05:38 PM    Glucose 123 (H) 11/13/2018 05:38 PM    BUN 37 (H) 11/13/2018 05:38 PM    Creatinine 1.90 (H) 11/13/2018 05:38 PM    BUN/Creatinine ratio 19 11/13/2018 05:38 PM    GFR est AA 33 (L) 11/13/2018 05:38 PM    GFR est non-AA 27 (L) 11/13/2018 05:38 PM    Calcium 9.0 11/13/2018 05:38 PM    Bilirubin, total 0.2 11/13/2018 05:38 PM    AST (SGOT) 13 (L) 11/13/2018 05:38 PM    Alk. phosphatase 62 11/13/2018 05:38 PM    Protein, total 6.7 11/13/2018 05:38 PM    Albumin 3.1 (L) 11/13/2018 05:38 PM    Globulin 3.6 11/13/2018 05:38 PM    A-G Ratio 0.9 (L) 11/13/2018 05:38 PM    ALT (SGPT) 21 11/13/2018 05:38 PM       EKG:  SB     Assessment:     Assessment:      1. PAF (paroxysmal atrial fibrillation) ([facility]    2. Essential hypertension    3. Morbid obesity ([facility]    4. H/O gastric bypass        Orders Placed This Encounter    AMB POC EKG ROUTINE W/ 12 LEADS, INTER & REP     Order Specific Question:   Reason for Exam:     Answer:   routine    sucralfate (CARAFATE) 100 mg/mL suspension     Sig: daily as needed.  nebivolol (BYSTOLIC) 5 mg tablet     Sig: Take 5 mg by mouth daily.  PREMARIN 0.625 mg/gram vaginal cream     Sig: Apply  to affected area daily.  doxazosin (CARDURA) 1 mg tablet     Sig: TAKE 1 TABLET BY MOUTH AT BEDTIME     Refill:  3    bumetanide (BUMEX) 1 mg tablet     Sig: Take 1 mg by mouth.  Every other day        Plan:     Patient presents for annual follow up. Her BP is elevated today and she reports its been running high for the last few months as high as 170/104. Her PCP started her on doxazosin 1 mg daily back in September 2019. She is also currently dealing with ear infection, followed by Dr Lance Cardona, ENT. In regards to her PAF, she denies no recurrent palpitation. She did not get her stress echo because of scheduling conflict     New onset Paroxysmal Afib in the setting of PNA in 11/18  Maintaining SR  ECHO: EF 55%, mild MR, mild TR, mild concentric hypertrophy   CHADSVASC=2.  Continue Eliquis 5 mg BID. Check stress echo to rule out ischemia for future antiarrhythmic options if necessary: not done  Fortunately, the patient can tell when she is in atrial fibrillation, so she will keep us posted if any recurrence. Low threshold to discontinue Eliquis if recurrent melena for a longer period of time in light of situational A. fib in the setting of UTI.     HTN  Elevated. Continue Bystolic 5 mg daily---SB today, will not uptitrate  Cr 1.9 in 11/18. Doxazosin 1 mg was initiated by pcp 9/19 per pt  She will check BP at home regularly while resting for 5 minutes and call PCP if >140/85  Counseled as well on sodium restriction and exercise to help reduce blood pressure     Lipids and labs followed by PCP. Takes Bumex every other day for leg swelling    Hx multiple myeloma, in remission since 2012  Followed by Dr Ryan Masters current care and f/u in 1 year, sooner if abnormal stress echo or new symptoms.       Salbador Villeda MD

## 2020-01-15 NOTE — LETTER
1/15/20 Patient: Vladimir Clark YOB: 1960 Date of Visit: 1/15/2020 Jodeane Spatz, MD 
[address] [address] [address] [place] [address] VIA Facsimile: [phone] Dear Jodeane Spatz, MD, Thank you for referring Ms. Vladimir Clark to [address] [place] [address] for evaluation. My notes for this consultation are attached. If you have questions, please do not hesitate to call me. I look forward to following your patient along with you.  
 
 
Sincerely, 
 
Dipika Michelle MD

## 2020-01-15 NOTE — PROGRESS NOTES
1. Have you been to the ER, urgent care clinic since your last visit? Hospitalized since your last visit? No.    2. Have you seen or consulted any other health care providers outside of the [address] [place] [address] since your last visit? Include any pap smears or colon screening. Seen by PCP.       Chief Complaint   Patient presents with    Follow-up     6 month- headaches in am when waking, BP elevated

## 2020-08-26 ENCOUNTER — HOSPITAL ENCOUNTER (OUTPATIENT)
Dept: GENERAL RADIOLOGY | Age: 60
Discharge: HOME | End: 2020-08-26
Attending: INTERNAL MEDICINE
Payer: COMMERCIAL

## 2020-08-26 DIAGNOSIS — E85.9 MYELOMA ASSOCIATED AMYLOIDOSIS (HCC): ICD-10-CM

## 2020-08-26 DIAGNOSIS — C90.00 MYELOMA ASSOCIATED AMYLOIDOSIS (HCC): ICD-10-CM

## 2020-08-26 PROCEDURE — 77075 RADEX OSSEOUS SURVEY COMPL: CPT

## 2020-08-26 NOTE — PROGRESS NOTES
Have you been tested for COVID-19 in the past 7 days? No    Do you have a personal history of COVID-19 within the past 28 days? No  If Yes, What was the method of testing: clinical assumption or test result? Have you had close contact with a known to be positive COVID-19 patient within the past 14 days? No    Are you a healthcare worker or ? No  If Yes, have you been exposed to COVID-19 without proper PPE? Do you live in a SNF, adult home or other institutional setting? No  If Yes, have they experienced a flood of COVID-19 positive patients?     In the past 2-14 days have you had any of the following symptoms - No   Cough   New onset Shortness of breath or difficulty breathing    Or at least two of these symptoms:   Fever greater than 100 F   Chills   Repeated shaking with chills   Muscle pain   Headache   Sore throat   New loss of taste or smell   New onset diarrhea

## 2020-08-27 ENCOUNTER — HOSPITAL ENCOUNTER (OUTPATIENT)
Dept: CT IMAGING | Age: 60
Discharge: HOME | End: 2020-08-27
Attending: INTERNAL MEDICINE
Payer: COMMERCIAL

## 2020-08-27 VITALS
RESPIRATION RATE: 14 BRPM | TEMPERATURE: 98.6 F | HEIGHT: 66 IN | DIASTOLIC BLOOD PRESSURE: 81 MMHG | HEART RATE: 67 BPM | BODY MASS INDEX: 25.39 KG/M2 | SYSTOLIC BLOOD PRESSURE: 148 MMHG | OXYGEN SATURATION: 97 % | WEIGHT: 158 LBS

## 2020-08-27 DIAGNOSIS — M81.0 AGE-RELATED OSTEOPOROSIS WITHOUT CURRENT PATHOLOGICAL FRACTURE: ICD-10-CM

## 2020-08-27 DIAGNOSIS — D47.2 MONOCLONAL GAMMOPATHY: ICD-10-CM

## 2020-08-27 DIAGNOSIS — C90.00 MULTIPLE MYELOMA NOT HAVING ACHIEVED REMISSION (HCC): ICD-10-CM

## 2020-08-27 DIAGNOSIS — F41.9 ANXIETY DISORDER: ICD-10-CM

## 2020-08-27 DIAGNOSIS — D50.9 IRON DEFICIENCY ANEMIA, UNSPECIFIED: ICD-10-CM

## 2020-08-27 LAB
BASOPHILS # BLD: 0 K/UL (ref 0–0.1)
BASOPHILS NFR BLD: 1 % (ref 0–1)
DIFFERENTIAL METHOD BLD: ABNORMAL
EOSINOPHIL # BLD: 0.1 K/UL (ref 0–0.4)
EOSINOPHIL NFR BLD: 2 % (ref 0–7)
ERYTHROCYTE [DISTWIDTH] IN BLOOD BY AUTOMATED COUNT: 14 % (ref 11.5–14.5)
HCT VFR BLD AUTO: 42.5 % (ref 35–47)
HGB BLD-MCNC: 13.3 G/DL (ref 11.5–16)
IMM GRANULOCYTES # BLD AUTO: 0 K/UL (ref 0–0.04)
IMM GRANULOCYTES NFR BLD AUTO: 0 % (ref 0–0.5)
LYMPHOCYTES # BLD: 1.2 K/UL (ref 0.8–3.5)
LYMPHOCYTES NFR BLD: 31 % (ref 12–49)
MCH RBC QN AUTO: 29.8 PG (ref 26–34)
MCHC RBC AUTO-ENTMCNC: 31.3 G/DL (ref 30–36.5)
MCV RBC AUTO: 95.3 FL (ref 80–99)
MONOCYTES # BLD: 0.4 K/UL (ref 0–1)
MONOCYTES NFR BLD: 11 % (ref 5–13)
NEUTS SEG # BLD: 2.2 K/UL (ref 1.8–8)
NEUTS SEG NFR BLD: 55 % (ref 32–75)
NRBC # BLD: 0 K/UL (ref 0–0.01)
NRBC BLD-RTO: 0 PER 100 WBC
PLATELET # BLD AUTO: 106 K/UL (ref 150–400)
PMV BLD AUTO: 9.8 FL (ref 8.9–12.9)
RBC # BLD AUTO: 4.46 M/UL (ref 3.8–5.2)
WBC # BLD AUTO: 3.9 K/UL (ref 3.6–11)

## 2020-08-27 PROCEDURE — 74011250636 HC RX REV CODE- 250/636: Performed by: INTERNAL MEDICINE

## 2020-08-27 PROCEDURE — 88305 TISSUE EXAM BY PATHOLOGIST: CPT

## 2020-08-27 PROCEDURE — 38221 DX BONE MARROW BIOPSIES: CPT

## 2020-08-27 PROCEDURE — 88364 INSITU HYBRIDIZATION (FISH): CPT

## 2020-08-27 PROCEDURE — 88342 IMHCHEM/IMCYTCHM 1ST ANTB: CPT

## 2020-08-27 PROCEDURE — 88360 TUMOR IMMUNOHISTOCHEM/MANUAL: CPT

## 2020-08-27 PROCEDURE — 88311 DECALCIFY TISSUE: CPT

## 2020-08-27 PROCEDURE — 88365 INSITU HYBRIDIZATION (FISH): CPT

## 2020-08-27 PROCEDURE — 36415 COLL VENOUS BLD VENIPUNCTURE: CPT

## 2020-08-27 PROCEDURE — 85025 COMPLETE CBC W/AUTO DIFF WBC: CPT

## 2020-08-27 PROCEDURE — 88313 SPECIAL STAINS GROUP 2: CPT

## 2020-08-27 PROCEDURE — 74011250636 HC RX REV CODE- 250/636: Performed by: RADIOLOGY

## 2020-08-27 RX ORDER — SODIUM CHLORIDE 9 MG/ML
50 INJECTION, SOLUTION INTRAVENOUS CONTINUOUS
Status: CANCELLED | OUTPATIENT
Start: 2020-08-27

## 2020-08-27 RX ORDER — MIDAZOLAM HYDROCHLORIDE 1 MG/ML
INJECTION, SOLUTION INTRAMUSCULAR; INTRAVENOUS
Status: DISCONTINUED
Start: 2020-08-27 | End: 2020-08-27

## 2020-08-27 RX ORDER — MIDAZOLAM HYDROCHLORIDE 1 MG/ML
0.5 INJECTION, SOLUTION INTRAMUSCULAR; INTRAVENOUS
Status: DISCONTINUED | OUTPATIENT
Start: 2020-08-27 | End: 2020-08-27

## 2020-08-27 RX ORDER — FENTANYL CITRATE 50 UG/ML
100 INJECTION, SOLUTION INTRAMUSCULAR; INTRAVENOUS
Status: DISCONTINUED | OUTPATIENT
Start: 2020-08-27 | End: 2020-08-27

## 2020-08-27 RX ORDER — MIDAZOLAM HYDROCHLORIDE 5 MG/ML
0-5 INJECTION, SOLUTION INTRAMUSCULAR; INTRAVENOUS
Status: DISCONTINUED | OUTPATIENT
Start: 2020-08-27 | End: 2020-08-27 | Stop reason: SDUPTHER

## 2020-08-27 RX ORDER — MIDAZOLAM HYDROCHLORIDE 1 MG/ML
5 INJECTION, SOLUTION INTRAMUSCULAR; INTRAVENOUS
Status: CANCELLED | OUTPATIENT
Start: 2020-08-27

## 2020-08-27 RX ORDER — SODIUM CHLORIDE 9 MG/ML
25 INJECTION, SOLUTION INTRAVENOUS CONTINUOUS
Status: DISCONTINUED | OUTPATIENT
Start: 2020-08-27 | End: 2020-08-31 | Stop reason: HOSPADM

## 2020-08-27 RX ORDER — FENTANYL CITRATE 50 UG/ML
100 INJECTION, SOLUTION INTRAMUSCULAR; INTRAVENOUS
Status: CANCELLED | OUTPATIENT
Start: 2020-08-27

## 2020-08-27 RX ADMIN — FENTANYL CITRATE 100 MCG: 50 INJECTION, SOLUTION INTRAMUSCULAR; INTRAVENOUS at 08:29

## 2020-08-27 RX ADMIN — MIDAZOLAM HYDROCHLORIDE 2 MG: 1 INJECTION, SOLUTION INTRAMUSCULAR; INTRAVENOUS at 08:29

## 2020-08-27 NOTE — H&P
Interventional and Vascular Radiology History and Physical    Patient: Zenaida Beasley 56 y.o. female       Chief Complaint: No chief complaint on file.       History of Present Illness: monoclonal gammopathy     History:    Past Medical History:   Diagnosis Date    Cancer ([address]     multiple Myolomia    Chronic kidney disease     2010-dr tillman    Dyspepsia 4/11/07    Edema of both legs 4/11/07    FH: CAD (coronary artery disease)     pt denies cad    [facility] 2007    [facility]    Gastrointestinal disorder     gastric bypass , and unclers    Headache(784.0) 4/11/07    HTN (hypertension) 4/11/07    Joint pain 4/11/07    Morbid obesity ([address] 4/11/07    Multiple myeloma     dr Vidal Garnica- diagnosed 2010    Multiple myeloma, without mention of having achieved remission 11/4/2011    Perforated viscus 7/18/2014    S/P gastric bypass 11/4/2011    Sleep apnea 4/11/07    Stool color black      Family History   Problem Relation Age of Onset    Hypertension Mother     Diabetes Father     Heart Disease Father     Stroke Father     Breast Cancer Cousin 41        bilateral mastectomy     Social History     Socioeconomic History    Marital status:      Spouse name: Not on file    Number of children: Not on file    Years of education: Not on file    Highest education level: Not on file   Occupational History    Not on file   Social Needs    Financial resource strain: Not on file    Food insecurity     Worry: Not on file     Inability: Not on file   [employer] needs     Medical: Not on file     Non-medical: Not on file   Tobacco Use    Smoking status: Former Smoker     Types: Cigarettes    Smokeless tobacco: Never Used    Tobacco comment: quit 30 years ago   Substance and Sexual Activity    Alcohol use: Yes     Frequency: Monthly or less     Comment: once a month maybe    Drug use: No    Sexual activity: Not on file   Lifestyle    Physical activity Days per week: Not on file     Minutes per session: Not on file    Stress: Not on file   Relationships    Social connections     Talks on phone: Not on file     Gets together: Not on file     Attends [employer] service: Not on file     Active member of club or organization: Not on file     Attends meetings of clubs or organizations: Not on file     Relationship status: Not on file    Intimate partner violence     Fear of current or ex partner: Not on file     Emotionally abused: Not on file     Physically abused: Not on file     Forced sexual activity: Not on file   Other Topics Concern    Not on file   Social History Narrative    Not on file       Allergies: Allergies   Allergen Reactions    Bactrim [Sulfamethoprim Ds] Nausea and Vomiting    Bactrim [Sulfamethoprim] Nausea and Vomiting       Current Medications:  Current Outpatient Medications   Medication Sig    sucralfate (CARAFATE) 100 mg/mL suspension daily as needed.  nebivolol (BYSTOLIC) 5 mg tablet Take 5 mg by mouth daily.  PREMARIN 0.625 mg/gram vaginal cream Apply  to affected area daily.  doxazosin (CARDURA) 1 mg tablet TAKE 1 TABLET BY MOUTH AT BEDTIME    bumetanide (BUMEX) 1 mg tablet Take 1 mg by mouth. Every other day    ELIQUIS 5 mg tablet TAKE 1 TABLET BY MOUTH TWICE A DAY    LORazepam (ATIVAN) 1 mg tablet Take 1 mg by mouth nightly as needed for Anxiety.  acetaminophen (TYLENOL) 325 mg tablet Take 650 mg by mouth daily as needed for Pain. Current Facility-Administered Medications   Medication Dose Route Frequency    0.9% sodium chloride infusion  25 mL/hr IntraVENous CONTINUOUS        Physical Exam:  Blood pressure 148/81, pulse 67, temperature 98.6 °F (37 °C), resp. rate 14, height 5' 6\" (1.676 m), weight 71.7 kg (158 lb), SpO2 97 %, not currently breastfeeding.   LUNG: clear to auscultation bilaterally, HEART: regular rate and rhythm, S1, S2 normal, no murmur, click, rub or gallop      Alerts:    Hospital Problems Date Reviewed: 1/15/2020    None          Laboratory:      Recent Labs     08/27/20  0758   HGB 13.3   HCT 42.5   WBC 3.9   *         Plan of Care/Planned Procedure:  Risks, benefits, and alternatives reviewed with patient and she agrees to proceed with the procedure. Conscious sedation will be performed with IV fentanyl and versed.  Plan is for CT guided bone marrow biopsy       Recardo Baumgarten, MD

## 2020-08-27 NOTE — DISCHARGE INSTRUCTIONS
[facility]  Radiology Department  [phone]         Bone Marrow Biopsy Discharge Instructions      Go home and rest  and restrict your activity the next 24 hours. You have been given sedating medications, so do not drive or make important decisions today. Resume your previous diet and prescribed medications. You may shower in 24 hours. Do not soak or swim until the biopsy site has healed completely to minimize any risk of infection. Watch site for redness, drainage, pus, foul odor, increasing pain and fevers. Should this occur call you doctor immediatly. You may take Tylenol if allowed, as directed on the label, for pain or discomfort. Avoid Ibuprofen (Advil, Motrin etc.) and Aspirin today as they may increase your risk of bleeding. Be sure to follow up with your physician, and let him know how you are progressing and to receive your results. If you have any questions about your procedure today please call and ask to speak to a radiology nurse.        I

## 2020-10-26 ENCOUNTER — TRANSCRIBE ORDER (OUTPATIENT)
Dept: SCHEDULING | Age: 60
End: 2020-10-26

## 2020-10-26 DIAGNOSIS — Z51.11 ENCOUNTER FOR ANTINEOPLASTIC CHEMOTHERAPY: Primary | ICD-10-CM

## 2020-11-04 ENCOUNTER — HOSPITAL ENCOUNTER (OUTPATIENT)
Dept: INTERVENTIONAL RADIOLOGY/VASCULAR | Age: 60
Discharge: HOME | End: 2020-11-04
Attending: RADIOLOGY | Admitting: RADIOLOGY
Payer: COMMERCIAL

## 2020-11-04 VITALS
RESPIRATION RATE: 18 BRPM | OXYGEN SATURATION: 100 % | DIASTOLIC BLOOD PRESSURE: 79 MMHG | HEART RATE: 76 BPM | SYSTOLIC BLOOD PRESSURE: 146 MMHG | TEMPERATURE: 98.7 F

## 2020-11-04 DIAGNOSIS — Z51.11 ENCOUNTER FOR ANTINEOPLASTIC CHEMOTHERAPY: ICD-10-CM

## 2020-11-04 PROCEDURE — 77030011893 HC TY CUT DN TRIS -B

## 2020-11-04 PROCEDURE — 36561 INSERT TUNNELED CV CATH: CPT

## 2020-11-04 PROCEDURE — 77030031139 HC SUT VCRL2 J&J -A

## 2020-11-04 PROCEDURE — 77030010507 HC ADH SKN DERMBND J&J -B

## 2020-11-04 PROCEDURE — C1788 PORT, INDWELLING, IMP: HCPCS

## 2020-11-04 PROCEDURE — 2709999900 HC NON-CHARGEABLE SUPPLY

## 2020-11-04 PROCEDURE — C1894 INTRO/SHEATH, NON-LASER: HCPCS

## 2020-11-04 PROCEDURE — C1769 GUIDE WIRE: HCPCS

## 2020-11-04 PROCEDURE — 74011250636 HC RX REV CODE- 250/636: Performed by: STUDENT IN AN ORGANIZED HEALTH CARE EDUCATION/TRAINING PROGRAM

## 2020-11-04 PROCEDURE — 74011000250 HC RX REV CODE- 250: Performed by: STUDENT IN AN ORGANIZED HEALTH CARE EDUCATION/TRAINING PROGRAM

## 2020-11-04 RX ORDER — HEPARIN 100 UNIT/ML
300 SYRINGE INTRAVENOUS
Status: DISCONTINUED | OUTPATIENT
Start: 2020-11-04 | End: 2020-11-04 | Stop reason: HOSPADM

## 2020-11-04 RX ORDER — MIDAZOLAM HYDROCHLORIDE 1 MG/ML
5 INJECTION, SOLUTION INTRAMUSCULAR; INTRAVENOUS
Status: DISCONTINUED | OUTPATIENT
Start: 2020-11-04 | End: 2020-11-04 | Stop reason: HOSPADM

## 2020-11-04 RX ORDER — LIDOCAINE HYDROCHLORIDE AND EPINEPHRINE 10; 10 MG/ML; UG/ML
1.5 INJECTION, SOLUTION INFILTRATION; PERINEURAL ONCE
Status: COMPLETED | OUTPATIENT
Start: 2020-11-04 | End: 2020-11-04

## 2020-11-04 RX ORDER — CEFAZOLIN SODIUM/WATER 2 G/20 ML
2 SYRINGE (ML) INTRAVENOUS ONCE
Status: COMPLETED | OUTPATIENT
Start: 2020-11-04 | End: 2020-11-04

## 2020-11-04 RX ORDER — LIDOCAINE HYDROCHLORIDE 20 MG/ML
20 INJECTION, SOLUTION INFILTRATION; PERINEURAL ONCE
Status: COMPLETED | OUTPATIENT
Start: 2020-11-04 | End: 2020-11-04

## 2020-11-04 RX ORDER — FENTANYL CITRATE 50 UG/ML
100 INJECTION, SOLUTION INTRAMUSCULAR; INTRAVENOUS
Status: DISCONTINUED | OUTPATIENT
Start: 2020-11-04 | End: 2020-11-04 | Stop reason: HOSPADM

## 2020-11-04 RX ADMIN — MIDAZOLAM HYDROCHLORIDE 1 MG: 1 INJECTION, SOLUTION INTRAMUSCULAR; INTRAVENOUS at 13:54

## 2020-11-04 RX ADMIN — LIDOCAINE HYDROCHLORIDE,EPINEPHRINE BITARTRATE 15 MG: 10; .01 INJECTION, SOLUTION INFILTRATION; PERINEURAL at 13:43

## 2020-11-04 RX ADMIN — FENTANYL CITRATE 50 MCG: 50 INJECTION, SOLUTION INTRAMUSCULAR; INTRAVENOUS at 13:39

## 2020-11-04 RX ADMIN — LIDOCAINE HYDROCHLORIDE 20 MG: 20 INJECTION, SOLUTION INFILTRATION; PERINEURAL at 13:43

## 2020-11-04 RX ADMIN — MIDAZOLAM HYDROCHLORIDE 1 MG: 1 INJECTION, SOLUTION INTRAMUSCULAR; INTRAVENOUS at 13:39

## 2020-11-04 RX ADMIN — CEFAZOLIN SODIUM 2 G: 300 INJECTION, POWDER, LYOPHILIZED, FOR SOLUTION INTRAVENOUS at 13:26

## 2020-11-04 NOTE — PROGRESS NOTES
Pt tolerated placement well, verbalized understanding of d/c and follow up instructions, receipt for instructions signed.   Ride home with

## 2020-11-04 NOTE — PROCEDURES
Interventional Radiology  Procedure Note        11/4/2020 2:26 PM    Patient: Felisha Duke     Informed consent obtained    Diagnosis: Multiple myeloma    Procedure(s): Port placement    Specimens removed:  none    Complications: None    Primary Physician: Kay Musa MD    Recomendations: N/A    Discharge Disposition: stable for discharge to home    Full dictated report to follow    Kay Musa MD  Interventional Radiology  [facility]  2:26 PM, 11/4/2020

## 2021-01-03 ENCOUNTER — APPOINTMENT (OUTPATIENT)
Dept: GENERAL RADIOLOGY | Age: 61
End: 2021-01-03
Attending: EMERGENCY MEDICINE
Payer: COMMERCIAL

## 2021-01-03 ENCOUNTER — HOSPITAL ENCOUNTER (EMERGENCY)
Age: 61
Discharge: HOME | End: 2021-01-03
Attending: EMERGENCY MEDICINE
Payer: COMMERCIAL

## 2021-01-03 VITALS
HEIGHT: 66 IN | RESPIRATION RATE: 18 BRPM | HEART RATE: 76 BPM | DIASTOLIC BLOOD PRESSURE: 77 MMHG | BODY MASS INDEX: 27.57 KG/M2 | TEMPERATURE: 98.1 F | WEIGHT: 171.52 LBS | OXYGEN SATURATION: 98 % | SYSTOLIC BLOOD PRESSURE: 150 MMHG

## 2021-01-03 DIAGNOSIS — R00.2 PALPITATIONS: Primary | ICD-10-CM

## 2021-01-03 LAB
ALBUMIN SERPL-MCNC: 3.5 G/DL (ref 3.5–5)
ALBUMIN/GLOB SERPL: 1.3 {RATIO} (ref 1.1–2.2)
ALP SERPL-CCNC: 76 U/L (ref 45–117)
ALT SERPL-CCNC: 19 U/L (ref 12–78)
ANION GAP SERPL CALC-SCNC: 4 MMOL/L (ref 5–15)
AST SERPL-CCNC: 11 U/L (ref 15–37)
BASOPHILS # BLD: 0 K/UL (ref 0–0.1)
BASOPHILS NFR BLD: 0 % (ref 0–1)
BILIRUB SERPL-MCNC: 0.3 MG/DL (ref 0.2–1)
BUN SERPL-MCNC: 34 MG/DL (ref 6–20)
BUN/CREAT SERPL: 20 (ref 12–20)
CALCIUM SERPL-MCNC: 7.7 MG/DL (ref 8.5–10.1)
CHLORIDE SERPL-SCNC: 115 MMOL/L (ref 97–108)
CO2 SERPL-SCNC: 23 MMOL/L (ref 21–32)
CREAT SERPL-MCNC: 1.69 MG/DL (ref 0.55–1.02)
D DIMER PPP FEU-MCNC: 0.42 MG/L FEU (ref 0–0.65)
DIFFERENTIAL METHOD BLD: ABNORMAL
EOSINOPHIL # BLD: 0 K/UL (ref 0–0.4)
EOSINOPHIL NFR BLD: 1 % (ref 0–7)
ERYTHROCYTE [DISTWIDTH] IN BLOOD BY AUTOMATED COUNT: 17 % (ref 11.5–14.5)
GLOBULIN SER CALC-MCNC: 2.7 G/DL (ref 2–4)
GLUCOSE SERPL-MCNC: 89 MG/DL (ref 65–100)
HCT VFR BLD AUTO: 38.1 % (ref 35–47)
HGB BLD-MCNC: 11.7 G/DL (ref 11.5–16)
IMM GRANULOCYTES # BLD AUTO: 0 K/UL (ref 0–0.04)
IMM GRANULOCYTES NFR BLD AUTO: 0 % (ref 0–0.5)
LYMPHOCYTES # BLD: 0.9 K/UL (ref 0.8–3.5)
LYMPHOCYTES NFR BLD: 28 % (ref 12–49)
MCH RBC QN AUTO: 29.1 PG (ref 26–34)
MCHC RBC AUTO-ENTMCNC: 30.7 G/DL (ref 30–36.5)
MCV RBC AUTO: 94.8 FL (ref 80–99)
MONOCYTES # BLD: 0.3 K/UL (ref 0–1)
MONOCYTES NFR BLD: 10 % (ref 5–13)
NEUTS SEG # BLD: 2 K/UL (ref 1.8–8)
NEUTS SEG NFR BLD: 61 % (ref 32–75)
NRBC # BLD: 0 K/UL (ref 0–0.01)
NRBC BLD-RTO: 0 PER 100 WBC
PLATELET # BLD AUTO: 175 K/UL (ref 150–400)
PMV BLD AUTO: 9 FL (ref 8.9–12.9)
POTASSIUM SERPL-SCNC: 4.2 MMOL/L (ref 3.5–5.1)
PROT SERPL-MCNC: 6.2 G/DL (ref 6.4–8.2)
RBC # BLD AUTO: 4.02 M/UL (ref 3.8–5.2)
SODIUM SERPL-SCNC: 142 MMOL/L (ref 136–145)
TROPONIN I BLD-MCNC: <0.04 NG/ML (ref 0–0.08)
TROPONIN I BLD-MCNC: <0.04 NG/ML (ref 0–0.08)
WBC # BLD AUTO: 3.3 K/UL (ref 3.6–11)

## 2021-01-03 PROCEDURE — 36415 COLL VENOUS BLD VENIPUNCTURE: CPT

## 2021-01-03 PROCEDURE — 85379 FIBRIN DEGRADATION QUANT: CPT

## 2021-01-03 PROCEDURE — 85025 COMPLETE CBC W/AUTO DIFF WBC: CPT

## 2021-01-03 PROCEDURE — 80053 COMPREHEN METABOLIC PANEL: CPT

## 2021-01-03 PROCEDURE — 99285 EMERGENCY DEPT VISIT HI MDM: CPT

## 2021-01-03 PROCEDURE — 71045 X-RAY EXAM CHEST 1 VIEW: CPT

## 2021-01-03 PROCEDURE — 93005 ELECTROCARDIOGRAM TRACING: CPT

## 2021-01-03 PROCEDURE — 84484 ASSAY OF TROPONIN QUANT: CPT

## 2021-01-03 NOTE — ED NOTES
Pt arrived to ED c/o chest pain and palpitations X 2 weeks. Pt reports a hx of HTN, a-fib, and multiple myeloma. Pt is AOX4 and monitored X3. Pt reports that the pain is intermittent and can last about 15 minutes. Pt also reports increased SOB upon exertion. Pt denies any pain at the beginning of assessment.

## 2021-01-04 LAB
ATRIAL RATE: 65 BPM
ATRIAL RATE: 73 BPM
CALCULATED P AXIS, ECG09: 40 DEGREES
CALCULATED P AXIS, ECG09: 44 DEGREES
CALCULATED R AXIS, ECG10: -20 DEGREES
CALCULATED R AXIS, ECG10: -21 DEGREES
CALCULATED T AXIS, ECG11: 21 DEGREES
CALCULATED T AXIS, ECG11: 42 DEGREES
DIAGNOSIS, 93000: NORMAL
DIAGNOSIS, 93000: NORMAL
P-R INTERVAL, ECG05: 174 MS
P-R INTERVAL, ECG05: 180 MS
Q-T INTERVAL, ECG07: 376 MS
Q-T INTERVAL, ECG07: 414 MS
QRS DURATION, ECG06: 84 MS
QRS DURATION, ECG06: 84 MS
QTC CALCULATION (BEZET), ECG08: 414 MS
QTC CALCULATION (BEZET), ECG08: 430 MS
VENTRICULAR RATE, ECG03: 65 BPM
VENTRICULAR RATE, ECG03: 73 BPM

## 2021-01-05 NOTE — ED PROVIDER NOTES
EMERGENCY DEPARTMENT HISTORY AND PHYSICAL EXAM      Date: 1/3/2021  Patient Name: March School    History of Presenting Illness     Chief Complaint   Patient presents with    Chest Pain     reports intermittent chest pain and palpitations for 2 week, hx of a fib    Palpitations       History Provided By: Patient    HPI: March School, 56 y.o. female with a past medical history significant for multiple myeloma on chemotherapy, history of palpitations, medical problems as stated below presents to the ED with cc of moderate intermittent chest pain over the last 2 weeks with associated palpitations. Pain is substernal and pleuritic in nature and does not radiate. Is not associated with exertion or deep inspiration. Pain does not radiate to the neck, arms, or back. There is no associated diaphoresis or vomiting. No lower extremity swelling. Patient is currently on chemotherapy for her multiple myeloma and sees Dr. Bruna Wells for her symptoms. She denies any other associated symptoms. No other exacerbating ameliorating factors. There are no other complaints, changes, or physical findings at this time. PCP: Jamal Johnson MD    No current facility-administered medications on file prior to encounter. Current Outpatient Medications on File Prior to Encounter   Medication Sig Dispense Refill    sucralfate (CARAFATE) 100 mg/mL suspension daily as needed.  nebivolol (BYSTOLIC) 5 mg tablet Take 5 mg by mouth daily.  PREMARIN 0.625 mg/gram vaginal cream Apply  to affected area daily.  doxazosin (CARDURA) 1 mg tablet TAKE 1 TABLET BY MOUTH AT BEDTIME  3    bumetanide (BUMEX) 1 mg tablet Take 1 mg by mouth. Every other day      ELIQUIS 5 mg tablet TAKE 1 TABLET BY MOUTH TWICE A DAY  0    LORazepam (ATIVAN) 1 mg tablet Take 1 mg by mouth nightly as needed for Anxiety.  acetaminophen (TYLENOL) 325 mg tablet Take 650 mg by mouth daily as needed for Pain.          Past History     Past Medical History:  Past Medical History:   Diagnosis Date    Cancer [facility])     multiple Myolomia    Chronic kidney disease     2010-dr tillman    Dyspepsia 4/11/07    Edema of both legs 4/11/07    FH: CAD (coronary artery disease)     pt denies cad    Gastric [facility] status for obesity 2007    [facility]    Gastrointestinal disorder     gastric bypass , and unclers    Headache(784.0) 4/11/07    HTN (hypertension) 4/11/07    Joint pain 4/11/07    Morbid obesity ([address] 4/11/07    Multiple myeloma     dr Soriano [facility]- diagnosed 2010    Multiple myeloma, without mention of having achieved remission 11/4/2011    Perforated viscus 7/18/2014    S/P gastric bypass 11/4/2011    Sleep apnea 4/11/07    Stool color black        Past Surgical History:  Past Surgical History:   Procedure Laterality Date    HX GASTRIC BYPASS  7/9/07    GASTRIC BYPASS--MONTOYA    HX GYN  2001    hysterectomy    HX TOTAL ABDOMINAL HYSTERECTOMY  2002    IR INSERT TUNL CVC W PORT OVER 5 YEARS  11/4/2020       Family History:  Family History   Problem Relation Age of Onset    Hypertension Mother     Diabetes Father     Heart Disease Father     Stroke Father     Breast Cancer Cousin 41        bilateral mastectomy       Social History:  Social History     Tobacco Use    Smoking status: Former Smoker     Types: Cigarettes    Smokeless tobacco: Never Used    Tobacco comment: quit 30 years ago   Substance Use Topics    Alcohol use: Yes     Frequency: Monthly or less     Comment: once a month maybe    Drug use: No       Allergies: Allergies   Allergen Reactions    Bactrim [Sulfamethoprim Ds] Nausea and Vomiting    Bactrim [Sulfamethoprim] Nausea and Vomiting         Review of Systems   Review of Systems   Constitutional: Negative for chills, diaphoresis, fatigue and fever. HENT: Negative for ear pain and sore throat. Eyes: Negative for pain and redness. Respiratory: Negative for cough and shortness of breath. Cardiovascular: Positive for chest pain and palpitations. Negative for leg swelling. Gastrointestinal: Negative for abdominal pain, diarrhea, nausea and vomiting. Endocrine: Negative for cold intolerance and heat intolerance. Genitourinary: Negative for flank pain and hematuria. Musculoskeletal: Negative for back pain and neck stiffness. Skin: Negative for rash and wound. Neurological: Negative for dizziness, syncope and headaches. All other systems reviewed and are negative. Physical Exam   Physical Exam  Vitals signs and nursing note reviewed. Constitutional:       Appearance: She is well-developed. HENT:      Head: Normocephalic and atraumatic. Mouth/Throat:      Pharynx: No oropharyngeal exudate. Eyes:      Conjunctiva/sclera: Conjunctivae normal.      Pupils: Pupils are equal, round, and reactive to light. Neck:      Musculoskeletal: Normal range of motion. Cardiovascular:      Rate and Rhythm: Normal rate and regular rhythm. Heart sounds: No murmur. Pulmonary:      Effort: Pulmonary effort is normal. No respiratory distress. Breath sounds: Normal breath sounds. No wheezing. Abdominal:      General: Bowel sounds are normal. There is no distension. Palpations: Abdomen is soft. Tenderness: There is no abdominal tenderness. Musculoskeletal: Normal range of motion. General: No deformity. Skin:     General: Skin is warm and dry. Findings: No rash. Neurological:      Mental Status: She is alert and oriented to person, place, and time. Coordination: Coordination normal.   Psychiatric:         Behavior: Behavior normal.         Diagnostic Study Results     Labs -   No results found for this or any previous visit (from the past 24 hour(s)).   Recent Results (from the past 168 hour(s))   EKG, 12 LEAD, INITIAL    Collection Time: 01/03/21  3:55 PM   Result Value Ref Range    Ventricular Rate 73 BPM    Atrial Rate 73 BPM    P-R Interval 174 ms    QRS Duration 84 ms    Q-T Interval 376 ms    QTC Calculation (Bezet) 414 ms    Calculated P Axis 44 degrees    Calculated R Axis -21 degrees    Calculated T Axis 42 degrees    Diagnosis       Sinus rhythm with premature atrial complexes  When compared with ECG of 06-NOV-2018 05:05,  Sinus rhythm has replaced Atrial fibrillation  Vent. rate has decreased BY  87 BPM  ST no longer depressed in Inferior leads  ST no longer depressed in Anterior leads  T wave inversion no longer evident in Inferior leads  Confirmed by Essie Carter ([id number]) on 1/4/2021 1:52:13 PM     CBC WITH AUTOMATED DIFF    Collection Time: 01/03/21  4:08 PM   Result Value Ref Range    WBC 3.3 (L) 3.6 - 11.0 K/uL    RBC 4.02 3.80 - 5.20 M/uL    HGB 11.7 11.5 - 16.0 g/dL    HCT 38.1 35.0 - 47.0 %    MCV 94.8 80.0 - 99.0 FL    MCH 29.1 26.0 - 34.0 PG    MCHC 30.7 30.0 - 36.5 g/dL    RDW 17.0 (H) 11.5 - 14.5 %    PLATELET 838 013 - 298 K/uL    MPV 9.0 8.9 - 12.9 FL    NRBC 0.0 0  WBC    ABSOLUTE NRBC 0.00 0.00 - 0.01 K/uL    NEUTROPHILS 61 32 - 75 %    LYMPHOCYTES 28 12 - 49 %    MONOCYTES 10 5 - 13 %    EOSINOPHILS 1 0 - 7 %    BASOPHILS 0 0 - 1 %    IMMATURE GRANULOCYTES 0 0.0 - 0.5 %    ABS. NEUTROPHILS 2.0 1.8 - 8.0 K/UL    ABS. LYMPHOCYTES 0.9 0.8 - 3.5 K/UL    ABS. MONOCYTES 0.3 0.0 - 1.0 K/UL    ABS. EOSINOPHILS 0.0 0.0 - 0.4 K/UL    ABS. BASOPHILS 0.0 0.0 - 0.1 K/UL    ABS. IMM.  GRANS. 0.0 0.00 - 0.04 K/UL    DF AUTOMATED     METABOLIC PANEL, COMPREHENSIVE    Collection Time: 01/03/21  4:08 PM   Result Value Ref Range    Sodium 142 136 - 145 mmol/L    Potassium 4.2 3.5 - 5.1 mmol/L    Chloride 115 (H) 97 - 108 mmol/L    CO2 23 21 - 32 mmol/L    Anion gap 4 (L) 5 - 15 mmol/L    Glucose 89 65 - 100 mg/dL    BUN 34 (H) 6 - 20 MG/DL    Creatinine 1.69 (H) 0.55 - 1.02 MG/DL    BUN/Creatinine ratio 20 12 - 20      GFR est AA 37 (L) >60 ml/min/1.73m2    GFR est non-AA 31 (L) >60 ml/min/1.73m2    Calcium 7.7 (L) 8.5 - 10.1 MG/DL Bilirubin, total 0.3 0.2 - 1.0 MG/DL    ALT (SGPT) 19 12 - 78 U/L    AST (SGOT) 11 (L) 15 - 37 U/L    Alk. phosphatase 76 45 - 117 U/L    Protein, total 6.2 (L) 6.4 - 8.2 g/dL    Albumin 3.5 3.5 - 5.0 g/dL    Globulin 2.7 2.0 - 4.0 g/dL    A-G Ratio 1.3 1.1 - 2.2     D DIMER    Collection Time: 01/03/21  4:08 PM   Result Value Ref Range    D-dimer 0.42 0.00 - 0.65 mg/L FEU   POC TROPONIN-I    Collection Time: 01/03/21  4:08 PM   Result Value Ref Range    Troponin-I (POC) <0.04 0.00 - 0.08 ng/mL   EKG, 12 LEAD, INITIAL    Collection Time: 01/03/21  6:09 PM   Result Value Ref Range    Ventricular Rate 65 BPM    Atrial Rate 65 BPM    P-R Interval 180 ms    QRS Duration 84 ms    Q-T Interval 414 ms    QTC Calculation (Bezet) 430 ms    Calculated P Axis 40 degrees    Calculated R Axis -20 degrees    Calculated T Axis 21 degrees    Diagnosis       Normal sinus rhythm  Normal ECG    Confirmed by Essie Carter ([id number]) on 1/4/2021 1:52:39 PM     POC TROPONIN-I    Collection Time: 01/03/21  6:18 PM   Result Value Ref Range    Troponin-I (POC) <0.04 0.00 - 0.08 ng/mL       Radiologic Studies -   XR CHEST PORT   Final Result   IMPRESSION: No acute findings. CT Results  (Last 48 hours)    None        CXR Results  (Last 48 hours)               01/03/21 1656  XR CHEST PORT Final result    Impression:  IMPRESSION: No acute findings. Narrative:  EXAM: XR CHEST PORT       INDICATION: chest pain       COMPARISON: 11/5/2018       FINDINGS: A portable AP radiograph of the chest was obtained at 1637 hours. Right IJ portacatheter is shown in the interval terminating in the superior vena   cava. The lungs and pleural margins are clear. Cardiac size remains normal.   There is unchanged mild thoracic aortic tortuosity with otherwise normal   mediastinal and hilar enlargement no substantial osseous abnormality is shown. Medical Decision Making   I am the first provider for this patient.     I reviewed the vital signs, available nursing notes, past medical history, past surgical history, family history and social history. Vital Signs-Reviewed the patient's vital signs. No data found. Vitals:    01/03/21 1800 01/03/21 1900 01/03/21 2000 01/03/21 2030   BP: (!) 159/71 (!) 150/75 (!) 157/83 (!) 150/77   Pulse: 66 72 75 76   Resp: 16 15 17 18   Temp:       SpO2: 98% 99% 98% 98%   Weight:       Height:           Records Reviewed: Nursing records and medical records reviewed    MDM:  Patient presents with CP. DDx:  ACS, Aortic dissection, PNA, PE, PTX, pericarditis, myocarditis, GERD, costochondritis, anxiety. Patient presents with palpitations and feeling of skipping beats. DDx: Likely PVCs due to either dehydration, infection, stress, electrolyte anomaly vs. Afib vs. Aflutter with RVR, sinus tachycardia, SVT, stable VTach, anxiety. Will get labs, EKG, and treat rhythm as indicated. If ED workup unremarkable, will refer to PCP or Cardiology for outpatient Holter vs event monitor. Provider Notes (Medical Decision Making):   Patient is 77-year-old female on chemotherapy for multiple myeloma presenting with intermittent chest pain and palpitations. Her EKG showed no evidence of acute coronary syndrome nor and her EKG is negative as is her repeat EKG and troponin. Her cardiac monitoring shows no evidence of ectopy or dysrhythmia. She is low risk for PE based on my restratification her D-dimer was negative. At this time is unclear the etiology of her pain but I feel she is low risk and okay with for follow-up as an outpatient with Dr. Paris Fisher and/or her primary care doctor. ED Course:   Initial assessment performed. The patients presenting problems have been discussed, and they are in agreement with the care plan formulated and outlined with them. I have encouraged them to ask questions as they arise throughout their visit.                  Critical Care:  None      Disposition:  12:11 AM  Leander Sebastian Hong's  results have been reviewed with her. She has been counseled regarding her diagnosis. She verbally conveys understanding and agreement of the signs, symptoms, diagnosis, treatment and prognosis and additionally agrees to follow up as recommended with Dr. Debra Larsen MD in 24 - 48 hours. She also agrees with the care-plan and conveys that all of her questions have been answered. I have also put together some discharge instructions for her that include: 1) educational information regarding their diagnosis, 2) how to care for their diagnosis at home, as well a 3) list of reasons why they would want to return to the ED prior to their follow-up appointment, should their condition change. DISCHARGE PLAN:  1. Discharge Medication List as of 1/3/2021  8:31 PM        2. Follow-up Information     Follow up With Specialties Details Why Gustavo Fierro MD Cardiology, [address] [facility] Vascular Surgery, Internal Medicine In 3 days For a follow-up evaluation. [address] [facility]  [phone]      Debra Larsen MD Internal Medicine In 1 week For a follow-up evaluation. [address] [facility]  [phone]      [facility] EMERGENCY DEPT Emergency Medicine In 1 day If symptoms worsen [address] [place] [address]  [phone]        3. Return to ED if worse     Diagnosis     Clinical Impression:   1. Palpitations        Attestations:    Petar Armstrong MD    Please note that this dictation was completed with [employer], the computer voice recognition software. Quite often unanticipated grammatical, syntax, homophones, and other interpretive errors are inadvertently transcribed by the computer software. Please disregard these errors. Please excuse any errors that have escaped final proofreading. Thank you.

## 2021-01-21 ENCOUNTER — CLINICAL SUPPORT (OUTPATIENT)
Dept: CARDIOLOGY CLINIC | Age: 61
End: 2021-01-21

## 2021-01-21 ENCOUNTER — OFFICE VISIT (OUTPATIENT)
Dept: CARDIOLOGY CLINIC | Age: 61
End: 2021-01-21
Payer: COMMERCIAL

## 2021-01-21 VITALS
DIASTOLIC BLOOD PRESSURE: 64 MMHG | SYSTOLIC BLOOD PRESSURE: 123 MMHG | OXYGEN SATURATION: 98 % | RESPIRATION RATE: 18 BRPM | HEIGHT: 66 IN | BODY MASS INDEX: 28.77 KG/M2 | TEMPERATURE: 97.6 F | HEART RATE: 77 BPM | WEIGHT: 179 LBS

## 2021-01-21 DIAGNOSIS — R00.2 INTERMITTENT PALPITATIONS: ICD-10-CM

## 2021-01-21 DIAGNOSIS — I48.0 PAF (PAROXYSMAL ATRIAL FIBRILLATION) (HCC): Primary | ICD-10-CM

## 2021-01-21 DIAGNOSIS — I10 ESSENTIAL HYPERTENSION: ICD-10-CM

## 2021-01-21 DIAGNOSIS — Z98.84 H/O GASTRIC BYPASS: ICD-10-CM

## 2021-01-21 DIAGNOSIS — I48.0 PAF (PAROXYSMAL ATRIAL FIBRILLATION) (HCC): ICD-10-CM

## 2021-01-21 PROCEDURE — 93000 ELECTROCARDIOGRAM COMPLETE: CPT | Performed by: NURSE PRACTITIONER

## 2021-01-21 PROCEDURE — 99214 OFFICE O/P EST MOD 30 MIN: CPT | Performed by: NURSE PRACTITIONER

## 2021-01-21 RX ORDER — AMOXICILLIN 875 MG/1
TABLET, FILM COATED ORAL
COMMUNITY

## 2021-01-21 RX ORDER — DEXAMETHASONE 4 MG/1
TABLET ORAL
COMMUNITY

## 2021-01-21 RX ORDER — GUAIFENESIN 100 MG/5ML
81 LIQUID (ML) ORAL DAILY
COMMUNITY

## 2021-01-21 RX ORDER — POMALIDOMIDE 4 MG/1
CAPSULE ORAL
COMMUNITY

## 2021-01-21 NOTE — PROGRESS NOTES
[address] [facility] [place], [address] [facility]  [phone]     Subjective:      Salomon Tellez is a 56 y.o. female is here for routine f/u. Last seen by us in 1/2020. Went to ED 1/3/2021 for 2 wk hx intermittent cp and palpitations. In the ED, Her EKG showed no evidence of acute coronary syndrome nor and her EKG is negative as is her repeat EKG and troponin. Her cardiac monitoring shows no evidence of ectopy or dysrhythmia. DDimer neg. Today, she presents in NSR but states she has intermittent palpitations, 1-2 times a week. Denies cp but when she has the palpitation, its not  Very comfortable. She is back on chemo for multiple myeloma. The patient denies chest pain/ shortness of breath, orthopnea, PND, LE edema, syncope, or presyncope.        Patient Active Problem List    Diagnosis Date Noted    Anemia, unspecified 02/17/2011     Priority: 1 - One    Gastrointestinal hemorrhage with melena 12/05/2018    H/O gastric bypass 12/05/2018    Pneumonia 11/06/2018    UTI (urinary tract infection) 11/06/2018    PAF (paroxysmal atrial fibrillation) ([address] 11/06/2018    HTN (hypertension) 11/05/2018    S/P gastric bypass 11/04/2011    Multiple myeloma, without mention of having achieved remission 11/04/2011    Morbid obesity ([address] 04/12/2011    Epigastric abdominal pain 04/12/2011    Multiple myeloma       Brad Bailey MD  Past Medical History:   Diagnosis Date    Cancer [facility])     multiple Myolomia    Chronic kidney disease     2010-dr tillman    Dyspepsia 4/11/07    Edema of both legs 4/11/07    FH: CAD (coronary artery disease)     pt denies cad    Gastric bypass status for obesity 2007    [facility]    Gastrointestinal disorder     gastric bypass , and unclers    Headache(784.0) 4/11/07    HTN (hypertension) 4/11/07    Joint pain 4/11/07    Morbid obesity ([address] 4/11/07    Multiple myeloma     dr Arlen Vasquez- diagnosed 2010    Multiple myeloma, without mention of having achieved remission 11/4/2011    Perforated viscus 7/18/2014    S/P gastric bypass 11/4/2011    Sleep apnea 4/11/07    Stool color black       Past Surgical History:   Procedure Laterality Date    HX GASTRIC BYPASS  7/9/07    GASTRIC BYPASS--MONTOYA    HX GYN  2001    hysterectomy    HX TOTAL ABDOMINAL HYSTERECTOMY  2002    IR INSERT TUNL CVC W PORT OVER 5 YEARS  11/4/2020     Allergies   Allergen Reactions    Bactrim [Sulfamethoprim Ds] Nausea and Vomiting    Bactrim [Sulfamethoprim] Nausea and Vomiting      Family History   Problem Relation Age of Onset    Hypertension Mother     Diabetes Father     Heart Disease Father     Stroke Father     Breast Cancer Cousin 41        bilateral mastectomy      Social History     Socioeconomic History    Marital status:      Spouse name: Not on file    Number of children: Not on file    Years of education: Not on file    Highest education level: Not on file   Occupational History    Not on file   Social Needs    Financial resource strain: Not on file    Food insecurity     Worry: Not on file     Inability: Not on file   [employer] needs     Medical: Not on file     Non-medical: Not on file   Tobacco Use    Smoking status: Former Smoker     Types: Cigarettes    Smokeless tobacco: Never Used    Tobacco comment: quit 30 years ago   Substance and Sexual Activity    Alcohol use: Yes     Frequency: Monthly or less     Comment: once a month maybe    Drug use: No    Sexual activity: Not on file   Lifestyle    Physical activity     Days per week: Not on file     Minutes per session: Not on file    Stress: Not on file   Relationships    Social connections     Talks on phone: Not on file     Gets together: Not on file     Attends [employer] service: Not on file     Active member of club or organization: Not on file     Attends meetings of clubs or organizations: Not on file     Relationship status: Not on file   Kem Intimate partner violence     Fear of current or ex partner: Not on file     Emotionally abused: Not on file     Physically abused: Not on file     Forced sexual activity: Not on file   Other Topics Concern    Not on file   Social History Narrative    Not on file      Current Outpatient Medications   Medication Sig    dexAMETHasone (DECADRON) 4 mg tablet dexamethasone    amoxicillin (AMOXIL) 875 mg tablet amoxicillin 875 mg tablet   Take 1 tablet every 12 hours by oral route for 10 days.  pomalidomide (Pomalyst) 4 mg cap Take  by mouth.  aspirin 81 mg chewable tablet Take 81 mg by mouth daily.  nebivolol (BYSTOLIC) 5 mg tablet Take 5 mg by mouth daily.  doxazosin (CARDURA) 1 mg tablet TAKE 1 TABLET BY MOUTH AT BEDTIME    bumetanide (BUMEX) 1 mg tablet Take 1 mg by mouth. Every other day    ELIQUIS 5 mg tablet TAKE 1 TABLET BY MOUTH TWICE A DAY    LORazepam (ATIVAN) 1 mg tablet Take 1 mg by mouth nightly as needed for Anxiety.  acetaminophen (TYLENOL) 325 mg tablet Take 650 mg by mouth daily as needed for Pain.  montelukast sodium (SINGULAIR PO) montelukast    sucralfate (CARAFATE) 100 mg/mL suspension daily as needed.  PREMARIN 0.625 mg/gram vaginal cream Apply  to affected area daily. No current facility-administered medications for this visit. Review of Symptoms:  11 systems reviewed, negative other than as stated in the HPI    Physical ExamPhysical Exam:    Vitals:    01/21/21 1106   BP: 123/64   Pulse: 77   Resp: 18   Temp: 97.6 °F (36.4 °C)   TempSrc: Skin   SpO2: 98%   Weight: 179 lb (81.2 kg)   Height: 5' 6\" (1.676 m)     Body mass index is 28.89 kg/m². General PE  Gen:  NAD  Mental Status - Alert. General Appearance - Not in acute distress. HEENT:  PERRL, no carotid bruits or JVD  Chest and Lung Exam   Inspection: Accessory muscles - No use of accessory muscles in breathing.    Auscultation:   Breath sounds: - Normal.   Cardiovascular   Inspection: Jugular vein - Bilateral - Inspection Normal.   Palpation/Percussion:   Apical Impulse: - Normal.   Auscultation: Rhythm - Regular. Heart Sounds - S1 WNL and S2 WNL. No S3 or S4. Murmurs & Other Heart Sounds: Auscultation of the heart reveals - No Murmurs. Peripheral Vascular   Upper Extremity: Inspection - Bilateral - No Cyanotic nailbeds or Digital clubbing. Lower Extremity:   Palpation: Edema - Bilateral - No edema. Abdomen:   Soft, non-tender, bowel sounds are active. Neuro: A&O times 3, CN and motor grossly WNL    Labs:   Lab Results   Component Value Date/Time    Cholesterol, total 191 04/10/2009 10:00 AM    HDL Cholesterol 55 04/10/2009 10:00 AM    LDL, calculated 116.4 (H) 04/10/2009 10:00 AM    Triglyceride 98 04/10/2009 10:00 AM    CHOL/HDL Ratio 3.5 04/10/2009 10:00 AM     Lab Results   Component Value Date/Time    CK 54 11/13/2018 05:38 PM     Lab Results   Component Value Date/Time    Sodium 142 01/03/2021 04:08 PM    Potassium 4.2 01/03/2021 04:08 PM    Chloride 115 (H) 01/03/2021 04:08 PM    CO2 23 01/03/2021 04:08 PM    Anion gap 4 (L) 01/03/2021 04:08 PM    Glucose 89 01/03/2021 04:08 PM    BUN 34 (H) 01/03/2021 04:08 PM    Creatinine 1.69 (H) 01/03/2021 04:08 PM    BUN/Creatinine ratio 20 01/03/2021 04:08 PM    GFR est AA 37 (L) 01/03/2021 04:08 PM    GFR est non-AA 31 (L) 01/03/2021 04:08 PM    Calcium 7.7 (L) 01/03/2021 04:08 PM    Bilirubin, total 0.3 01/03/2021 04:08 PM    Alk. phosphatase 76 01/03/2021 04:08 PM    Protein, total 6.2 (L) 01/03/2021 04:08 PM    Albumin 3.5 01/03/2021 04:08 PM    Globulin 2.7 01/03/2021 04:08 PM    A-G Ratio 1.3 01/03/2021 04:08 PM    ALT (SGPT) 19 01/03/2021 04:08 PM       EKG:  NSR      Assessment:     Assessment:      1. PAF (paroxysmal atrial fibrillation) ([address]    2. Essential hypertension    3. H/O gastric bypass    4.  Intermittent palpitations        Orders Placed This Encounter    AMB POC EKG ROUTINE W/ 12 LEADS, INTER & REP     Order Specific Question: Reason for Exam:     Answer:   routine    dexAMETHasone (DECADRON) 4 mg tablet     Sig: dexamethasone    amoxicillin (AMOXIL) 875 mg tablet     Sig: amoxicillin 875 mg tablet   Take 1 tablet every 12 hours by oral route for 10 days.  montelukast sodium (SINGULAIR PO)     Sig: montelukast    pomalidomide (Pomalyst) 4 mg cap     Sig: Take  by mouth.  aspirin 81 mg chewable tablet     Sig: Take 81 mg by mouth daily. Plan:     Paroxysmal Afib in the setting of PNA in 11/18  Maintaining SR  ECHO 11/18: EF 55%, mild MR, mild TR, mild concentric hypertrophy   CHADSVASC=2.  Continue Eliquis 5 mg BID. Fortunately, the patient can tell when she is in atrial fibrillation, so she will keep us posted if any recurrence. Low threshold to discontinue Eliquis if recurrent melena for a longer period of time in light of situational A. fib in the setting of UTI. Obtain 1 mos event for c/o intermittent palpitation  Defers any stress testing at this time, no cp.   And currently doing chemo, dealing with Multiple Myeloma    HTN  Controlled with current therapy    CKD III  Cr 1.69 in 1/2021  Dr Aung Rhodes is following her labs     Lipids and labs followed by PCP.       Has Bumex every other day for leg swelling--has not needed this     Multiple myeloma, relapse  Now on chemotherapy  Followed by Dr Meera Luis current care and f/u in 6 months      Bryce Bueno NP

## 2021-01-21 NOTE — PROGRESS NOTES
Cheri Kovacs is a 56 y.o. female  Chief Complaint   Patient presents with    Follow-up    Claudication    Shortness of Breath     increased since start of Chemo     1. Have you been to the ER, urgent care clinic since your last visit? ER 2 weeks ago. Hospitalized since your last visit? 2. Have you seen or consulted any other health care providers outside of the [address] [place] [address] since your last visit? Include any pap smears or colon screening.  No  Visit Vitals  /64 (BP 1 Location: Left arm, BP Patient Position: At rest)   Pulse 77   Temp 97.6 °F (36.4 °C) (Skin)   Resp 18   Ht 5' 6\" (1.676 m)   Wt 179 lb (81.2 kg)   SpO2 98%   BMI 28.89 kg/m²     Health Maintenance   Topic Date Due    Hepatitis C Screening  1960    Pneumococcal 0-64 years (1 of 3 - PCV13) 01/24/1964    COVID-19 Vaccine (1 of 2) 01/24/1974    Shingrix Vaccine Age 50> (1 of 2) 01/24/2008    PAP AKA CERVICAL CYTOLOGY  06/01/2013    Lipid Screen  04/10/2014    Breast Cancer Screen Mammogram  07/11/2019    Colorectal Cancer Screening Combo  11/13/2019    Flu Vaccine (1) 09/01/2020    DTaP/Tdap/Td series (2 - Td) 05/11/2022    Bone Densitometry (Dexa) Screening  01/24/2023

## 2021-01-21 NOTE — PROGRESS NOTES
Patient received a 30 day event monitor. Instructions given verbally as well as an instruction sheet. Pt verbalized understanding.     [facility] Event Monitoring

## 2021-02-25 ENCOUNTER — TELEPHONE (OUTPATIENT)
Dept: CARDIOLOGY CLINIC | Age: 61
End: 2021-02-25

## 2021-02-25 NOTE — TELEPHONE ENCOUNTER
Verified patient with two identifiers. Pt informed of monitor results. She states she notices some palpitations while on the chemo drug. Informed that if palpitations persist or become bothersome we can make an appt to re-address. Pt verbalized understanding.

## 2021-02-25 NOTE — TELEPHONE ENCOUNTER
----- Message from Hemalatha Ray MD sent at 2/25/2021  9:51 AM EST -----  Please advise no atrial fibrillation or other arrhythmias seen on her monitor. Rare early heartbeats from the upper and lower chambers of the heart that are not of concern. Follow-up in 6 months if doing well.

## 2021-02-25 NOTE — PROGRESS NOTES
Please advise no atrial fibrillation or other arrhythmias seen on her monitor. Rare early heartbeats from the upper and lower chambers of the heart that are not of concern. Follow-up in 6 months if doing well.

## 2022-01-25 ENCOUNTER — TRANSCRIBE ORDER (OUTPATIENT)
Dept: SCHEDULING | Age: 62
End: 2022-01-25

## 2022-01-25 DIAGNOSIS — N39.0 URINARY TRACT INFECTION, SITE NOT SPECIFIED: ICD-10-CM

## 2022-01-25 DIAGNOSIS — C90.00 MULTIPLE MYELOMA NOT HAVING ACHIEVED REMISSION (HCC): ICD-10-CM

## 2022-01-25 DIAGNOSIS — D47.2 MONOCLONAL GAMMOPATHY: Primary | ICD-10-CM

## 2022-01-25 DIAGNOSIS — Z51.11 ENCOUNTER FOR ANTINEOPLASTIC CHEMOTHERAPY: ICD-10-CM

## 2022-01-25 DIAGNOSIS — G47.00 INSOMNIA, UNSPECIFIED: ICD-10-CM

## 2022-01-25 DIAGNOSIS — M81.0 AGE-RELATED OSTEOPOROSIS WITHOUT CURRENT PATHOLOGICAL FRACTURE: ICD-10-CM

## 2022-01-25 DIAGNOSIS — F41.9 ANXIETY DISORDER, UNSPECIFIED: ICD-10-CM

## 2022-01-25 DIAGNOSIS — R11.2 NAUSEA WITH VOMITING, UNSPECIFIED: ICD-10-CM

## 2022-01-25 DIAGNOSIS — E83.51 HYPOCALCEMIA: ICD-10-CM

## 2022-01-25 DIAGNOSIS — G89.3 NEOPLASM RELATED PAIN (ACUTE) (CHRONIC): ICD-10-CM

## 2022-01-25 DIAGNOSIS — D50.9 IRON DEFICIENCY ANEMIA, UNSPECIFIED: ICD-10-CM

## 2022-01-25 DIAGNOSIS — K12.1 OTHER FORMS OF STOMATITIS: ICD-10-CM

## 2022-02-10 ENCOUNTER — TELEPHONE (OUTPATIENT)
Dept: CARDIOLOGY CLINIC | Age: 62
End: 2022-02-10

## 2022-02-10 NOTE — TELEPHONE ENCOUNTER
Informed patient the physician who took her off Eliquis for procedure will need to be the one to ask her to resume.

## 2022-02-10 NOTE — TELEPHONE ENCOUNTER
Patient had stem cell transplant in aug of 2021 and the dr. Wood Bane her off of eliquis and she is wondering if she should be taking it.  Call was disconnected but I think this Call her at [address]

## 2022-02-11 ENCOUNTER — TRANSCRIBE ORDER (OUTPATIENT)
Dept: SCHEDULING | Age: 62
End: 2022-02-11

## 2022-02-11 DIAGNOSIS — G89.3 NEOPLASM RELATED PAIN: ICD-10-CM

## 2022-02-11 DIAGNOSIS — M81.0 SENILE OSTEOPOROSIS: ICD-10-CM

## 2022-02-11 DIAGNOSIS — R11.2 NAUSEA WITH VOMITING: Primary | ICD-10-CM

## 2022-02-17 ENCOUNTER — HOSPITAL ENCOUNTER (OUTPATIENT)
Dept: ULTRASOUND IMAGING | Age: 62
Discharge: HOME | End: 2022-02-17
Attending: INTERNAL MEDICINE
Payer: COMMERCIAL

## 2022-02-17 DIAGNOSIS — M81.0 AGE-RELATED OSTEOPOROSIS WITHOUT CURRENT PATHOLOGICAL FRACTURE: ICD-10-CM

## 2022-02-17 DIAGNOSIS — E83.51 HYPOCALCEMIA: ICD-10-CM

## 2022-02-17 DIAGNOSIS — R11.2 NAUSEA WITH VOMITING, UNSPECIFIED: ICD-10-CM

## 2022-02-17 DIAGNOSIS — K12.1 OTHER FORMS OF STOMATITIS: ICD-10-CM

## 2022-02-17 DIAGNOSIS — Z51.11 ENCOUNTER FOR ANTINEOPLASTIC CHEMOTHERAPY: ICD-10-CM

## 2022-02-17 DIAGNOSIS — F41.9 ANXIETY DISORDER, UNSPECIFIED: ICD-10-CM

## 2022-02-17 DIAGNOSIS — C90.00 MULTIPLE MYELOMA NOT HAVING ACHIEVED REMISSION (HCC): ICD-10-CM

## 2022-02-17 DIAGNOSIS — G47.00 INSOMNIA, UNSPECIFIED: ICD-10-CM

## 2022-02-17 DIAGNOSIS — N39.0 URINARY TRACT INFECTION, SITE NOT SPECIFIED: ICD-10-CM

## 2022-02-17 DIAGNOSIS — D47.2 MONOCLONAL GAMMOPATHY: ICD-10-CM

## 2022-02-17 DIAGNOSIS — D50.9 IRON DEFICIENCY ANEMIA, UNSPECIFIED: ICD-10-CM

## 2022-02-17 DIAGNOSIS — G89.3 NEOPLASM RELATED PAIN (ACUTE) (CHRONIC): ICD-10-CM

## 2022-02-17 PROCEDURE — 76770 US EXAM ABDO BACK WALL COMP: CPT

## 2022-03-03 ENCOUNTER — HOSPITAL ENCOUNTER (OUTPATIENT)
Dept: MRI IMAGING | Age: 62
Discharge: HOME | End: 2022-03-03
Attending: NURSE PRACTITIONER
Payer: COMMERCIAL

## 2022-03-03 DIAGNOSIS — M81.0 SENILE OSTEOPOROSIS: ICD-10-CM

## 2022-03-03 DIAGNOSIS — G89.3 NEOPLASM RELATED PAIN: ICD-10-CM

## 2022-03-03 PROCEDURE — 72148 MRI LUMBAR SPINE W/O DYE: CPT

## 2022-03-18 PROBLEM — J18.9 PNEUMONIA: Status: ACTIVE | Noted: 2018-11-06

## 2022-03-18 PROBLEM — I10 HTN (HYPERTENSION): Status: ACTIVE | Noted: 2018-11-05

## 2022-03-19 PROBLEM — Z98.84 H/O GASTRIC BYPASS: Status: ACTIVE | Noted: 2018-12-05

## 2022-03-19 PROBLEM — N39.0 UTI (URINARY TRACT INFECTION): Status: ACTIVE | Noted: 2018-11-06

## 2022-03-19 PROBLEM — I48.0 PAF (PAROXYSMAL ATRIAL FIBRILLATION) (HCC): Status: ACTIVE | Noted: 2018-11-06

## 2022-03-20 PROBLEM — K92.1 GASTROINTESTINAL HEMORRHAGE WITH MELENA: Status: ACTIVE | Noted: 2018-12-05

## 2022-10-10 NOTE — PROGRESS NOTES
[address], [facility], [address]  [phone]     Subjective:      Leslee Bryant is a 57 y.o. female is here for routine f/u. Pmhx PAF, HTN, CKD III, Multiple Myeloma and gastric bypass. Last seen by me in 1/2021    S/p stem cell therapy 8/2021, has recurrent UTIs since. Doing chemo twice a month. Followed by Dr Chelsea Ritchie    No recurrent palpitation. the patient denies chest pain/ shortness of breath, orthopnea, PND, LE edema, palpitations, syncope, or presyncope.       Patient Active Problem List    Diagnosis Date Noted    Anemia, unspecified 02/17/2011    Gastrointestinal hemorrhage with melena 12/05/2018    H/O gastric bypass 12/05/2018    Pneumonia 11/06/2018    UTI (urinary tract infection) 11/06/2018    PAF (paroxysmal atrial fibrillation) ([address] 11/06/2018    HTN (hypertension) 11/05/2018    S/P gastric bypass 11/04/2011    Multiple myeloma, without mention of having achieved remission 11/04/2011    Morbid obesity ([address] 04/12/2011    Epigastric abdominal pain 04/12/2011    Multiple myeloma       Ashley Garnica MD  Past Medical History:   Diagnosis Date    Cancer [facility])     multiple Myolomia    Chronic kidney disease     2010-dr tillman    Dyspepsia 4/11/07    Edema of both legs 4/11/07    FH: CAD (coronary artery disease)     pt denies cad    Gastric [facility] for obesity 2007    [facility]    Gastrointestinal disorder     gastric bypass , and unclers    Headache(784.0) 4/11/07    HTN (hypertension) 4/11/07    Joint pain 4/11/07    Morbid obesity ([address] 4/11/07    Multiple myeloma     dr Mika Maldonado- diagnosed 2010    Multiple myeloma, without mention of having achieved remission 11/4/2011    Perforated viscus 7/18/2014    S/P gastric bypass 11/4/2011    Sleep apnea 4/11/07    Stool color black       Past Surgical History:   Procedure Laterality Date    HX GASTRIC BYPASS  7/9/07    GASTRIC BYPASS--MONTOYA    HX GYN  2001    hysterectomy    HX TOTAL ABDOMINAL HYSTERECTOMY  2002    IR INSERT TUNL CVC W PORT OVER 5 YEARS  11/4/2020     Allergies   Allergen Reactions    Bactrim [Sulfamethoprim Ds] Nausea and Vomiting    Bactrim [Sulfamethoprim] Nausea and Vomiting      Family History   Problem Relation Age of Onset    Hypertension Mother     Diabetes Father     Heart Disease Father     Stroke Father     Breast Cancer Cousin 39        bilateral mastectomy      Social History     Socioeconomic History    Marital status:      Spouse name: Not on file    Number of children: Not on file    Years of education: Not on file    Highest education level: Not on file   Occupational History    Not on file   Tobacco Use    Smoking status: Former     Types: Cigarettes    Smokeless tobacco: Never    Tobacco comments:     quit 30 years ago   Substance and Sexual Activity    Alcohol use: Yes     Comment: once a month maybe    Drug use: No    Sexual activity: Not on file   Other Topics Concern    Not on file   Social History Narrative    Not on file     Social Determinants of Health     Financial Resource Strain: Not on file   Food Insecurity: Not on file   Transportation Needs: Not on file   Physical Activity: Not on file   Stress: Not on file   Social Connections: Not on file   Intimate Partner Violence: Not on file   Housing Stability: Not on file      Current Outpatient Medications   Medication Sig    dexAMETHasone (DECADRON) 4 mg tablet dexamethasone    amoxicillin (AMOXIL) 875 mg tablet amoxicillin 875 mg tablet   Take 1 tablet every 12 hours by oral route for 10 days. montelukast sodium (SINGULAIR PO) montelukast    pomalidomide (Pomalyst) 4 mg cap Take  by mouth. aspirin 81 mg chewable tablet Take 81 mg by mouth daily. sucralfate (CARAFATE) 100 mg/mL suspension daily as needed. nebivolol (BYSTOLIC) 5 mg tablet Take 5 mg by mouth daily. PREMARIN 0.625 mg/gram vaginal cream Apply  to affected area daily.     doxazosin (CARDURA) 1 mg tablet TAKE 1 TABLET BY MOUTH AT BEDTIME    bumetanide (BUMEX) 1 mg tablet Take 1 mg by mouth. Every other day    ELIQUIS 5 mg tablet TAKE 1 TABLET BY MOUTH TWICE A DAY    LORazepam (ATIVAN) 1 mg tablet Take 1 mg by mouth nightly as needed for Anxiety. acetaminophen (TYLENOL) 325 mg tablet Take 650 mg by mouth daily as needed for Pain. No current facility-administered medications for this visit. Review of Symptoms:  11 systems reviewed, negative other than as stated in the HPI    Physical ExamPhysical Exam:    There were no vitals filed for this visit. There is no height or weight on file to calculate BMI. General PE  Gen:  NAD  Mental Status - Alert. General Appearance - Not in acute distress. HEENT:  PERRL, no carotid bruits or JVD  Chest and Lung Exam   Inspection: Accessory muscles - No use of accessory muscles in breathing. Auscultation:   Breath sounds: - Normal.   Cardiovascular   Inspection: Jugular vein - Bilateral - Inspection Normal.   Palpation/Percussion:   Apical Impulse: - Normal.   Auscultation: Rhythm - Regular. Heart Sounds - S1 WNL and S2 WNL. No S3 or S4. Murmurs & Other Heart Sounds: Auscultation of the heart reveals - No Murmurs. Peripheral Vascular   Upper Extremity: Inspection - Bilateral - No Cyanotic nailbeds or Digital clubbing. Lower Extremity:   Palpation: Edema - Bilateral - No edema. Abdomen:   Soft, non-tender, bowel sounds are active.   Neuro: A&O times 3, CN and motor grossly WNL    Labs:   Lab Results   Component Value Date/Time    Cholesterol, total 191 04/10/2009 10:00 AM    HDL Cholesterol 55 04/10/2009 10:00 AM    LDL, calculated 116.4 (H) 04/10/2009 10:00 AM    Triglyceride 98 04/10/2009 10:00 AM    CHOL/HDL Ratio 3.5 04/10/2009 10:00 AM     Lab Results   Component Value Date/Time    CK 54 11/13/2018 05:38 PM     Lab Results   Component Value Date/Time    Sodium 142 01/03/2021 04:08 PM    Potassium 4.2 01/03/2021 04:08 PM    Chloride 115 (H) 01/03/2021 04:08 PM    CO2 23 01/03/2021 04:08 PM    Anion gap 4 (L) 01/03/2021 04:08 PM    Glucose 89 01/03/2021 04:08 PM    BUN 34 (H) 01/03/2021 04:08 PM    Creatinine 1.69 (H) 01/03/2021 04:08 PM    BUN/Creatinine ratio 20 01/03/2021 04:08 PM    GFR est AA 37 (L) 01/03/2021 04:08 PM    GFR est non-AA 31 (L) 01/03/2021 04:08 PM    Calcium 7.7 (L) 01/03/2021 04:08 PM    Bilirubin, total 0.3 01/03/2021 04:08 PM    Alk. phosphatase 76 01/03/2021 04:08 PM    Protein, total 6.2 (L) 01/03/2021 04:08 PM    Albumin 3.5 01/03/2021 04:08 PM    Globulin 2.7 01/03/2021 04:08 PM    A-G Ratio 1.3 01/03/2021 04:08 PM    ALT (SGPT) 19 01/03/2021 04:08 PM       EKG:         Assessment:          ICD-10-CM ICD-9-CM    1. PAF (paroxysmal atrial fibrillation) (HCC)  I48.0 427.31       2. Essential hypertension  I10 401.9       3. Intermittent palpitations  R00.2 785.1       4. Primary hypertension  I10 401.9       5. Stage 3 chronic kidney disease, unspecified whether stage 3a or 3b CKD ([facility]  N18.30 585. 3             No orders of the defined types were placed in this encounter. Plan:     Paroxysmal Afib in the setting of PNA in 11/18  Maintaining SR  ECHO 11/18: EF 55%, mild MR, mild TR, mild concentric hypertrophy   1 mos event monitor in 2/2022: no recurrent AF  Now on ASA    HTN  Controlled with current therapy     CKD III  Serum Cr 2.52 in 1/2022  Cr 1.69 in 1/2021  Followed by Dr Graciela Lagunas and labs followed by PCP. Unable to calculate LDL as TG at 481 in 9/2021     Has Bumex every other day for leg swelling--no longer on this     Multiple myeloma, relapse  Now on chemotherapy  Followed by Dr Bessie Argueta         Patient was made aware during visit today that all testing completed would be instantaneously available on their MyChart for review. Discussed that these results will be made available to the provider at the same time.   They were advised to wait at least 3 business days to allow for provider's interpretation of results with follow-up before calling our office with concerns about their results.     Continue current care and f/u in 3 mos [facility] office        Nguyễn Perez NP

## 2022-11-07 ENCOUNTER — OFFICE VISIT (OUTPATIENT)
Dept: CARDIOLOGY CLINIC | Age: 62
End: 2022-11-07
Payer: COMMERCIAL

## 2022-11-07 VITALS
WEIGHT: 167.9 LBS | DIASTOLIC BLOOD PRESSURE: 80 MMHG | HEART RATE: 72 BPM | SYSTOLIC BLOOD PRESSURE: 128 MMHG | OXYGEN SATURATION: 99 % | BODY MASS INDEX: 26.98 KG/M2 | RESPIRATION RATE: 18 BRPM | HEIGHT: 66 IN

## 2022-11-07 DIAGNOSIS — I10 ESSENTIAL HYPERTENSION: ICD-10-CM

## 2022-11-07 DIAGNOSIS — I48.0 PAF (PAROXYSMAL ATRIAL FIBRILLATION) (HCC): Primary | ICD-10-CM

## 2022-11-07 DIAGNOSIS — N18.30 STAGE 3 CHRONIC KIDNEY DISEASE, UNSPECIFIED WHETHER STAGE 3A OR 3B CKD (HCC): ICD-10-CM

## 2022-11-07 DIAGNOSIS — I10 PRIMARY HYPERTENSION: ICD-10-CM

## 2022-11-07 DIAGNOSIS — R00.2 INTERMITTENT PALPITATIONS: ICD-10-CM

## 2022-11-07 PROCEDURE — 3078F DIAST BP <80 MM HG: CPT | Performed by: NURSE PRACTITIONER

## 2022-11-07 PROCEDURE — 99214 OFFICE O/P EST MOD 30 MIN: CPT | Performed by: NURSE PRACTITIONER

## 2022-11-07 PROCEDURE — 3074F SYST BP LT 130 MM HG: CPT | Performed by: NURSE PRACTITIONER

## 2022-11-07 RX ORDER — GUAIFENESIN 100 MG/5ML
81 LIQUID (ML) ORAL DAILY
Qty: 90 TABLET | Refills: 1
Start: 2022-11-07

## 2022-11-07 RX ORDER — LANOLIN ALCOHOL/MO/W.PET/CERES
100 CREAM (GRAM) TOPICAL DAILY
COMMUNITY

## 2022-11-07 RX ORDER — ACYCLOVIR 200 MG/1
1 CAPSULE ORAL 2 TIMES DAILY
COMMUNITY
Start: 2022-08-12

## 2022-11-07 RX ORDER — HYDROCODONE BITARTRATE AND ACETAMINOPHEN 7.5; 325 MG/1; MG/1
1 TABLET ORAL
COMMUNITY
Start: 2022-09-23

## 2022-11-07 RX ORDER — AMLODIPINE BESYLATE 5 MG/1
5 TABLET ORAL DAILY
COMMUNITY
Start: 2022-10-28 | End: 2023-10-28

## 2023-07-11 ENCOUNTER — APPOINTMENT (OUTPATIENT)
Facility: HOSPITAL | Age: 63
End: 2023-07-11
Payer: MEDICARE

## 2023-07-11 ENCOUNTER — HOSPITAL ENCOUNTER (INPATIENT)
Facility: HOSPITAL | Age: 63
LOS: 3 days | Discharge: HOME | End: 2023-07-14
Attending: STUDENT IN AN ORGANIZED HEALTH CARE EDUCATION/TRAINING PROGRAM | Admitting: INTERNAL MEDICINE
Payer: MEDICARE

## 2023-07-11 DIAGNOSIS — R11.2 NAUSEA VOMITING AND DIARRHEA: Primary | ICD-10-CM

## 2023-07-11 DIAGNOSIS — R19.7 NAUSEA VOMITING AND DIARRHEA: Primary | ICD-10-CM

## 2023-07-11 DIAGNOSIS — N17.9 AKI (ACUTE KIDNEY INJURY) (HCC): ICD-10-CM

## 2023-07-11 LAB
ALBUMIN SERPL-MCNC: 3.2 G/DL (ref 3.5–5)
ALBUMIN/GLOB SERPL: 1 (ref 1.1–2.2)
ALP SERPL-CCNC: 42 U/L (ref 45–117)
ALT SERPL-CCNC: 15 U/L (ref 12–78)
ANION GAP BLD CALC-SCNC: 8 (ref 10–20)
ANION GAP SERPL CALC-SCNC: 10 MMOL/L (ref 5–15)
APPEARANCE UR: ABNORMAL
AST SERPL-CCNC: 10 U/L (ref 15–37)
BACTERIA URNS QL MICRO: ABNORMAL /HPF
BASE DEFICIT BLD-SCNC: 11.7 MMOL/L
BASOPHILS # BLD: 0 K/UL (ref 0–0.1)
BASOPHILS NFR BLD: 0 % (ref 0–1)
BILIRUB SERPL-MCNC: 0.5 MG/DL (ref 0.2–1)
BILIRUB UR QL: NEGATIVE
BUN SERPL-MCNC: 77 MG/DL (ref 6–20)
BUN/CREAT SERPL: 17 (ref 12–20)
CA-I BLD-MCNC: 1.12 MMOL/L (ref 1.12–1.32)
CALCIUM SERPL-MCNC: 7.7 MG/DL (ref 8.5–10.1)
CHLORIDE BLD-SCNC: 115 MMOL/L (ref 100–108)
CHLORIDE SERPL-SCNC: 114 MMOL/L (ref 97–108)
CO2 BLD-SCNC: 16 MMOL/L (ref 19–24)
CO2 SERPL-SCNC: 15 MMOL/L (ref 21–32)
COLOR UR: ABNORMAL
COMMENT:: NORMAL
CREAT SERPL-MCNC: 4.65 MG/DL (ref 0.55–1.02)
CREAT UR-MCNC: 5.4 MG/DL (ref 0.6–1.3)
DIFFERENTIAL METHOD BLD: ABNORMAL
EOSINOPHIL # BLD: 0 K/UL (ref 0–0.4)
EOSINOPHIL NFR BLD: 0 % (ref 0–7)
EPITH CASTS URNS QL MICRO: ABNORMAL /LPF
ERYTHROCYTE [DISTWIDTH] IN BLOOD BY AUTOMATED COUNT: 14.3 % (ref 11.5–14.5)
GLOBULIN SER CALC-MCNC: 3.2 G/DL (ref 2–4)
GLUCOSE BLD STRIP.AUTO-MCNC: 79 MG/DL (ref 74–106)
GLUCOSE SERPL-MCNC: 96 MG/DL (ref 65–100)
GLUCOSE UR STRIP.AUTO-MCNC: NEGATIVE MG/DL
HCO3 BLDA-SCNC: 15 MMOL/L
HCT VFR BLD AUTO: 38.6 % (ref 35–47)
HGB BLD-MCNC: 12.6 G/DL (ref 11.5–16)
HGB UR QL STRIP: NEGATIVE
HYALINE CASTS URNS QL MICRO: ABNORMAL /LPF (ref 0–5)
IMM GRANULOCYTES # BLD AUTO: 0 K/UL (ref 0–0.04)
IMM GRANULOCYTES NFR BLD AUTO: 0 % (ref 0–0.5)
KETONES UR QL STRIP.AUTO: ABNORMAL MG/DL
LACTATE BLD-SCNC: 0.69 MMOL/L (ref 0.4–2)
LEUKOCYTE ESTERASE UR QL STRIP.AUTO: NEGATIVE
LYMPHOCYTES # BLD: 0.6 K/UL (ref 0.8–3.5)
LYMPHOCYTES NFR BLD: 16 % (ref 12–49)
MAGNESIUM SERPL-MCNC: 2.6 MG/DL (ref 1.6–2.4)
MCH RBC QN AUTO: 32.2 PG (ref 26–34)
MCHC RBC AUTO-ENTMCNC: 32.6 G/DL (ref 30–36.5)
MCV RBC AUTO: 98.7 FL (ref 80–99)
MONOCYTES # BLD: 0.6 K/UL (ref 0–1)
MONOCYTES NFR BLD: 15 % (ref 5–13)
NEUTS BAND NFR BLD MANUAL: 7 %
NEUTS SEG # BLD: 2.6 K/UL (ref 1.8–8)
NEUTS SEG NFR BLD: 62 % (ref 32–75)
NITRITE UR QL STRIP.AUTO: NEGATIVE
NRBC # BLD: 0 K/UL (ref 0–0.01)
NRBC BLD-RTO: 0 PER 100 WBC
PCO2 BLDV: 38.2 MMHG (ref 41–51)
PH BLDV: 7.22 (ref 7.32–7.42)
PH UR STRIP: 5 (ref 5–8)
PHOSPHATE SERPL-MCNC: 4.7 MG/DL (ref 2.6–4.7)
PLATELET # BLD AUTO: 75 K/UL (ref 150–400)
PMV BLD AUTO: 9.3 FL (ref 8.9–12.9)
PO2 BLDV: 35 MMHG (ref 25–40)
POTASSIUM BLD-SCNC: 3.5 MMOL/L (ref 3.5–5.5)
POTASSIUM SERPL-SCNC: 3.5 MMOL/L (ref 3.5–5.1)
PROT SERPL-MCNC: 6.4 G/DL (ref 6.4–8.2)
PROT UR STRIP-MCNC: 30 MG/DL
RBC # BLD AUTO: 3.91 M/UL (ref 3.8–5.2)
RBC #/AREA URNS HPF: ABNORMAL /HPF (ref 0–5)
RBC MORPH BLD: ABNORMAL
SAO2 % BLD: 56 %
SERVICE CMNT-IMP: ABNORMAL
SODIUM BLD-SCNC: 139 MMOL/L (ref 136–145)
SODIUM SERPL-SCNC: 139 MMOL/L (ref 136–145)
SP GR UR REFRACTOMETRY: 1.01
SPECIMEN HOLD: NORMAL
SPECIMEN SITE: ABNORMAL
TROPONIN I SERPL HS-MCNC: 59 NG/L (ref 0–51)
URINE CULTURE IF INDICATED: ABNORMAL
UROBILINOGEN UR QL STRIP.AUTO: 0.2 EU/DL (ref 0.2–1)
WBC # BLD AUTO: 3.8 K/UL (ref 3.6–11)
WBC MORPH BLD: ABNORMAL
WBC URNS QL MICRO: ABNORMAL /HPF (ref 0–4)

## 2023-07-11 PROCEDURE — 84132 ASSAY OF SERUM POTASSIUM: CPT

## 2023-07-11 PROCEDURE — 84484 ASSAY OF TROPONIN QUANT: CPT

## 2023-07-11 PROCEDURE — 93005 ELECTROCARDIOGRAM TRACING: CPT | Performed by: STUDENT IN AN ORGANIZED HEALTH CARE EDUCATION/TRAINING PROGRAM

## 2023-07-11 PROCEDURE — 81001 URINALYSIS AUTO W/SCOPE: CPT

## 2023-07-11 PROCEDURE — 87506 IADNA-DNA/RNA PROBE TQ 6-11: CPT

## 2023-07-11 PROCEDURE — 2580000003 HC RX 258: Performed by: INTERNAL MEDICINE

## 2023-07-11 PROCEDURE — 6360000002 HC RX W HCPCS: Performed by: INTERNAL MEDICINE

## 2023-07-11 PROCEDURE — 6360000002 HC RX W HCPCS: Performed by: PHYSICIAN ASSISTANT

## 2023-07-11 PROCEDURE — 84295 ASSAY OF SERUM SODIUM: CPT

## 2023-07-11 PROCEDURE — 6370000000 HC RX 637 (ALT 250 FOR IP): Performed by: INTERNAL MEDICINE

## 2023-07-11 PROCEDURE — 2060000000 HC ICU INTERMEDIATE R&B

## 2023-07-11 PROCEDURE — 80053 COMPREHEN METABOLIC PANEL: CPT

## 2023-07-11 PROCEDURE — 36415 COLL VENOUS BLD VENIPUNCTURE: CPT

## 2023-07-11 PROCEDURE — 85025 COMPLETE CBC W/AUTO DIFF WBC: CPT

## 2023-07-11 PROCEDURE — 87324 CLOSTRIDIUM AG IA: CPT

## 2023-07-11 PROCEDURE — 74176 CT ABD & PELVIS W/O CONTRAST: CPT

## 2023-07-11 PROCEDURE — 82947 ASSAY GLUCOSE BLOOD QUANT: CPT

## 2023-07-11 PROCEDURE — 99285 EMERGENCY DEPT VISIT HI MDM: CPT

## 2023-07-11 PROCEDURE — 89055 LEUKOCYTE ASSESSMENT FECAL: CPT

## 2023-07-11 PROCEDURE — 87040 BLOOD CULTURE FOR BACTERIA: CPT

## 2023-07-11 PROCEDURE — 82803 BLOOD GASES ANY COMBINATION: CPT

## 2023-07-11 PROCEDURE — 76770 US EXAM ABDO BACK WALL COMP: CPT

## 2023-07-11 PROCEDURE — 96376 TX/PRO/DX INJ SAME DRUG ADON: CPT

## 2023-07-11 PROCEDURE — 87449 NOS EACH ORGANISM AG IA: CPT

## 2023-07-11 PROCEDURE — 84100 ASSAY OF PHOSPHORUS: CPT

## 2023-07-11 PROCEDURE — 6360000002 HC RX W HCPCS: Performed by: STUDENT IN AN ORGANIZED HEALTH CARE EDUCATION/TRAINING PROGRAM

## 2023-07-11 PROCEDURE — 82330 ASSAY OF CALCIUM: CPT

## 2023-07-11 PROCEDURE — 96374 THER/PROPH/DIAG INJ IV PUSH: CPT

## 2023-07-11 PROCEDURE — 71045 X-RAY EXAM CHEST 1 VIEW: CPT

## 2023-07-11 PROCEDURE — 83735 ASSAY OF MAGNESIUM: CPT

## 2023-07-11 PROCEDURE — 2580000003 HC RX 258: Performed by: STUDENT IN AN ORGANIZED HEALTH CARE EDUCATION/TRAINING PROGRAM

## 2023-07-11 RX ORDER — SODIUM CHLORIDE 0.9 % (FLUSH) 0.9 %
5-40 SYRINGE (ML) INJECTION PRN
Status: DISCONTINUED | OUTPATIENT
Start: 2023-07-11 | End: 2023-07-14 | Stop reason: HOSPADM

## 2023-07-11 RX ORDER — ONDANSETRON 4 MG/1
4 TABLET, ORALLY DISINTEGRATING ORAL EVERY 8 HOURS PRN
Status: DISCONTINUED | OUTPATIENT
Start: 2023-07-11 | End: 2023-07-14 | Stop reason: HOSPADM

## 2023-07-11 RX ORDER — SODIUM CHLORIDE 9 MG/ML
INJECTION, SOLUTION INTRAVENOUS PRN
Status: DISCONTINUED | OUTPATIENT
Start: 2023-07-11 | End: 2023-07-14 | Stop reason: HOSPADM

## 2023-07-11 RX ORDER — 0.9 % SODIUM CHLORIDE 0.9 %
1000 INTRAVENOUS SOLUTION INTRAVENOUS ONCE
Status: COMPLETED | OUTPATIENT
Start: 2023-07-11 | End: 2023-07-11

## 2023-07-11 RX ORDER — ONDANSETRON 2 MG/ML
4 INJECTION INTRAMUSCULAR; INTRAVENOUS ONCE
Status: COMPLETED | OUTPATIENT
Start: 2023-07-11 | End: 2023-07-11

## 2023-07-11 RX ORDER — DILTIAZEM HYDROCHLORIDE 60 MG/1
TABLET, FILM COATED ORAL
COMMUNITY

## 2023-07-11 RX ORDER — SODIUM BICARBONATE 650 MG/1
650 TABLET ORAL 2 TIMES DAILY
Status: DISCONTINUED | OUTPATIENT
Start: 2023-07-11 | End: 2023-07-12

## 2023-07-11 RX ORDER — LOSARTAN POTASSIUM 25 MG/1
TABLET ORAL
COMMUNITY
Start: 2023-04-14

## 2023-07-11 RX ORDER — HEPARIN SODIUM 5000 [USP'U]/ML
5000 INJECTION, SOLUTION INTRAVENOUS; SUBCUTANEOUS EVERY 8 HOURS SCHEDULED
Status: DISPENSED | OUTPATIENT
Start: 2023-07-11 | End: 2023-07-13

## 2023-07-11 RX ORDER — SODIUM CHLORIDE 0.9 % (FLUSH) 0.9 %
5-40 SYRINGE (ML) INJECTION EVERY 12 HOURS SCHEDULED
Status: DISCONTINUED | OUTPATIENT
Start: 2023-07-11 | End: 2023-07-14 | Stop reason: HOSPADM

## 2023-07-11 RX ORDER — DEXLANSOPRAZOLE 60 MG/1
CAPSULE, DELAYED RELEASE ORAL
Status: ON HOLD | COMMUNITY
End: 2023-07-14 | Stop reason: HOSPADM

## 2023-07-11 RX ORDER — LORAZEPAM 1 MG/1
1 TABLET ORAL 3 TIMES DAILY PRN
COMMUNITY
Start: 2023-04-13

## 2023-07-11 RX ORDER — DULOXETIN HYDROCHLORIDE 30 MG/1
30 CAPSULE, DELAYED RELEASE ORAL NIGHTLY
Status: DISCONTINUED | OUTPATIENT
Start: 2023-07-11 | End: 2023-07-14 | Stop reason: HOSPADM

## 2023-07-11 RX ORDER — PANTOPRAZOLE SODIUM 40 MG/1
40 TABLET, DELAYED RELEASE ORAL
Status: DISCONTINUED | OUTPATIENT
Start: 2023-07-12 | End: 2023-07-12

## 2023-07-11 RX ORDER — DULOXETIN HYDROCHLORIDE 30 MG/1
30 CAPSULE, DELAYED RELEASE ORAL NIGHTLY
COMMUNITY
Start: 2023-01-19

## 2023-07-11 RX ORDER — PROCHLORPERAZINE EDISYLATE 5 MG/ML
10 INJECTION INTRAMUSCULAR; INTRAVENOUS EVERY 6 HOURS PRN
Status: DISCONTINUED | OUTPATIENT
Start: 2023-07-11 | End: 2023-07-14 | Stop reason: HOSPADM

## 2023-07-11 RX ORDER — BUMETANIDE 1 MG/1
1 TABLET ORAL EVERY OTHER DAY
Status: ON HOLD | COMMUNITY
End: 2023-07-14 | Stop reason: HOSPADM

## 2023-07-11 RX ORDER — LORAZEPAM 1 MG/1
1 TABLET ORAL 3 TIMES DAILY PRN
Status: DISCONTINUED | OUTPATIENT
Start: 2023-07-11 | End: 2023-07-14 | Stop reason: HOSPADM

## 2023-07-11 RX ORDER — ASPIRIN 81 MG/1
81 TABLET, CHEWABLE ORAL DAILY
Status: DISCONTINUED | OUTPATIENT
Start: 2023-07-11 | End: 2023-07-14 | Stop reason: HOSPADM

## 2023-07-11 RX ORDER — SODIUM BICARBONATE 650 MG/1
650 TABLET ORAL 2 TIMES DAILY
COMMUNITY
Start: 2023-04-10

## 2023-07-11 RX ORDER — SODIUM CHLORIDE 9 MG/ML
INJECTION, SOLUTION INTRAVENOUS CONTINUOUS
Status: DISCONTINUED | OUTPATIENT
Start: 2023-07-11 | End: 2023-07-12

## 2023-07-11 RX ORDER — ONDANSETRON 2 MG/ML
4 INJECTION INTRAMUSCULAR; INTRAVENOUS EVERY 6 HOURS PRN
Status: DISCONTINUED | OUTPATIENT
Start: 2023-07-11 | End: 2023-07-14 | Stop reason: HOSPADM

## 2023-07-11 RX ORDER — ACETAMINOPHEN 650 MG/1
650 SUPPOSITORY RECTAL EVERY 6 HOURS PRN
Status: DISCONTINUED | OUTPATIENT
Start: 2023-07-11 | End: 2023-07-14 | Stop reason: HOSPADM

## 2023-07-11 RX ORDER — ACETAMINOPHEN 325 MG/1
650 TABLET ORAL EVERY 6 HOURS PRN
Status: DISCONTINUED | OUTPATIENT
Start: 2023-07-11 | End: 2023-07-14 | Stop reason: HOSPADM

## 2023-07-11 RX ORDER — ONDANSETRON 2 MG/ML
4 INJECTION INTRAMUSCULAR; INTRAVENOUS EVERY 6 HOURS PRN
Status: DISCONTINUED | OUTPATIENT
Start: 2023-07-11 | End: 2023-07-12

## 2023-07-11 RX ORDER — CHOLECALCIFEROL (VITAMIN D3) 125 MCG
1000 CAPSULE ORAL DAILY
Status: DISCONTINUED | OUTPATIENT
Start: 2023-07-12 | End: 2023-07-14 | Stop reason: HOSPADM

## 2023-07-11 RX ADMIN — SODIUM CHLORIDE: 9 INJECTION, SOLUTION INTRAVENOUS at 21:04

## 2023-07-11 RX ADMIN — ONDANSETRON 4 MG: 2 INJECTION INTRAMUSCULAR; INTRAVENOUS at 18:31

## 2023-07-11 RX ADMIN — HEPARIN SODIUM 5000 UNITS: 5000 INJECTION INTRAVENOUS; SUBCUTANEOUS at 22:34

## 2023-07-11 RX ADMIN — SODIUM CHLORIDE, PRESERVATIVE FREE 5 ML: 5 INJECTION INTRAVENOUS at 22:16

## 2023-07-11 RX ADMIN — ACETAMINOPHEN 650 MG: 325 TABLET ORAL at 23:42

## 2023-07-11 RX ADMIN — ASPIRIN 81 MG: 81 TABLET, CHEWABLE ORAL at 22:34

## 2023-07-11 RX ADMIN — DULOXETINE 30 MG: 30 CAPSULE, DELAYED RELEASE ORAL at 22:35

## 2023-07-11 RX ADMIN — SODIUM CHLORIDE 1000 ML: 9 INJECTION, SOLUTION INTRAVENOUS at 18:57

## 2023-07-11 RX ADMIN — ONDANSETRON 4 MG: 2 INJECTION INTRAMUSCULAR; INTRAVENOUS at 20:13

## 2023-07-11 RX ADMIN — PROCHLORPERAZINE EDISYLATE 10 MG: 5 INJECTION INTRAMUSCULAR; INTRAVENOUS at 23:42

## 2023-07-11 RX ADMIN — SODIUM BICARBONATE 650 MG: 650 TABLET ORAL at 22:34

## 2023-07-11 RX ADMIN — SODIUM CHLORIDE 1000 ML: 9 INJECTION, SOLUTION INTRAVENOUS at 17:46

## 2023-07-11 RX ADMIN — SODIUM CHLORIDE 1000 ML: 9 INJECTION, SOLUTION INTRAVENOUS at 21:03

## 2023-07-11 ASSESSMENT — PAIN DESCRIPTION - LOCATION: LOCATION: HEAD

## 2023-07-11 ASSESSMENT — LIFESTYLE VARIABLES
HOW OFTEN DO YOU HAVE A DRINK CONTAINING ALCOHOL: NEVER
HOW MANY STANDARD DRINKS CONTAINING ALCOHOL DO YOU HAVE ON A TYPICAL DAY: PATIENT DOES NOT DRINK

## 2023-07-11 ASSESSMENT — PAIN SCALES - GENERAL
PAINLEVEL_OUTOF10: 3
PAINLEVEL_OUTOF10: 7

## 2023-07-11 NOTE — ED PROVIDER NOTES
[facility] EMERGENCY DEPT  EMERGENCY DEPARTMENT Lawrence Horn    MRN: [id number]  Birthdate 1960  Date of evaluation: 7/11/2023  Provider: Garret Palomo MD   PCP: Jamie Malone MD  Note Started: 8:22 PM 7/11/23     CHIEF COMPLAINT       Chief Complaint   Patient presents with    Diarrhea    Emesis    Back Pain     Onset Friday with diarrhea, vomiting and back pain. Was seen at primary care advise to come to ED . Pt bp at doctor office low in the 80s-pt has stage IV kidney disease, she states        HISTORY OF PRESENT ILLNESS: 1 or more elements   History From: pt, History limited by: none     Arielle Wade is a 70 y.o. female with Pmhx listed below     Patient is a 75-year-old female with history of multiple myeloma, CKD, gastric bypass, presenting with concern of nausea, vomiting, diarrhea and back pain x3 to 4 days. Patient states that she to me episodes of vomiting and diarrhea to count. Patient does not have any abdominal pain but does endorse some back pain. Patient states that she has taken Imodium at home but cannot tolerate fluids. Patient was at the [address] [place] [address] office today and had low blood pressures which prompted visit to ED. Did have a low-grade fever at the beginning of symptoms but none since, no cough URI symptoms, no blood in the stool, no recent travel or other sick contacts, no active chemotherapy, no other complaints this time. Nursing Notes were all reviewed and agreed with or any disagreements were addressed in the HPI. REVIEW OF SYSTEMS      Positives and Pertinent negatives as per HPI.     PAST HISTORY     Past Medical History:  Past Medical History:   Diagnosis Date    Cancer ([address])     multiple Myolomia    Chronic kidney disease     2010-dr willingham    Dyspepsia 4/11/07    Edema of both legs 4/11/07    FH: CAD (coronary artery disease)     pt denies cad    Gastric bypass status for obesity 2007    [facility]    Gastrointestinal disorder     gastric bypass , and

## 2023-07-11 NOTE — ED NOTES
Bedside report given to [facility]. , RN     Sarah Fry, RN  07/11/23 2207

## 2023-07-11 NOTE — ED NOTES
MD [facility] aware of patients BP in [address] [place] [address] Way     Nasreen Noel RN  07/11/23 6611

## 2023-07-12 LAB
ANION GAP SERPL CALC-SCNC: 8 MMOL/L (ref 5–15)
BASOPHILS # BLD: 0 K/UL (ref 0–0.1)
BASOPHILS NFR BLD: 0 % (ref 0–1)
BUN SERPL-MCNC: 66 MG/DL (ref 6–20)
BUN/CREAT SERPL: 20 (ref 12–20)
C COLI+JEJUNI TUF STL QL NAA+PROBE: POSITIVE
C DIFF GDH STL QL: NEGATIVE
C DIFF TOX A+B STL QL IA: NEGATIVE
C DIFF TOXIN INTERPRETATION: NORMAL
CALCIUM SERPL-MCNC: 6.7 MG/DL (ref 8.5–10.1)
CHLORIDE SERPL-SCNC: 123 MMOL/L (ref 97–108)
CHLORIDE UR-SCNC: 45 MMOL/L
CO2 SERPL-SCNC: 13 MMOL/L (ref 21–32)
CREAT SERPL-MCNC: 3.25 MG/DL (ref 0.55–1.02)
CREAT UR-MCNC: 58 MG/DL
DIFFERENTIAL METHOD BLD: ABNORMAL
EC STX1+STX2 GENES STL QL NAA+PROBE: NEGATIVE
EKG ATRIAL RATE: 78 BPM
EKG DIAGNOSIS: NORMAL
EKG P AXIS: 13 DEGREES
EKG P-R INTERVAL: 150 MS
EKG Q-T INTERVAL: 406 MS
EKG QRS DURATION: 86 MS
EKG QTC CALCULATION (BAZETT): 462 MS
EKG R AXIS: -26 DEGREES
EKG T AXIS: 29 DEGREES
EKG VENTRICULAR RATE: 78 BPM
EOSINOPHIL # BLD: 0 K/UL (ref 0–0.4)
EOSINOPHIL NFR BLD: 0 % (ref 0–7)
ERYTHROCYTE [DISTWIDTH] IN BLOOD BY AUTOMATED COUNT: 14.2 % (ref 11.5–14.5)
ETEC ELTA+ESTB GENES STL QL NAA+PROBE: NEGATIVE
GLUCOSE SERPL-MCNC: 84 MG/DL (ref 65–100)
HCT VFR BLD AUTO: 33.1 % (ref 35–47)
HGB BLD-MCNC: 10.5 G/DL (ref 11.5–16)
HGB BLD-MCNC: 10.9 G/DL (ref 11.5–16)
IMM GRANULOCYTES # BLD AUTO: 0 K/UL (ref 0–0.04)
IMM GRANULOCYTES NFR BLD AUTO: 0 % (ref 0–0.5)
LYMPHOCYTES # BLD: 0.6 K/UL (ref 0.8–3.5)
LYMPHOCYTES NFR BLD: 23 % (ref 12–49)
MCH RBC QN AUTO: 31.9 PG (ref 26–34)
MCHC RBC AUTO-ENTMCNC: 31.7 G/DL (ref 30–36.5)
MCV RBC AUTO: 100.6 FL (ref 80–99)
MONOCYTES # BLD: 0.3 K/UL (ref 0–1)
MONOCYTES NFR BLD: 12 % (ref 5–13)
NEUTS BAND NFR BLD MANUAL: 5 %
NEUTS SEG # BLD: 1.8 K/UL (ref 1.8–8)
NEUTS SEG NFR BLD: 60 % (ref 32–75)
NRBC # BLD: 0 K/UL (ref 0–0.01)
NRBC BLD-RTO: 0 PER 100 WBC
P SHIGELLOIDES DNA STL QL NAA+PROBE: NEGATIVE
PLATELET # BLD AUTO: 70 K/UL (ref 150–400)
PMV BLD AUTO: 9.7 FL (ref 8.9–12.9)
POTASSIUM SERPL-SCNC: 3.6 MMOL/L (ref 3.5–5.1)
RBC # BLD AUTO: 3.29 M/UL (ref 3.8–5.2)
RBC MORPH BLD: ABNORMAL
RBC MORPH BLD: ABNORMAL
SALMONELLA SP SPAO STL QL NAA+PROBE: NEGATIVE
SHIGELLA SP+EIEC IPAH STL QL NAA+PROBE: NEGATIVE
SODIUM SERPL-SCNC: 144 MMOL/L (ref 136–145)
SODIUM UR-SCNC: 42 MMOL/L
TROPONIN I SERPL HS-MCNC: 53 NG/L (ref 0–51)
V CHOL+PARA+VUL DNA STL QL NAA+NON-PROBE: NEGATIVE
WBC # BLD AUTO: 2.7 K/UL (ref 3.6–11)
WBC #/AREA STL HPF: NEGATIVE /HPF (ref 0–4)
WBC MORPH BLD: ABNORMAL
Y ENTEROCOL DNA STL QL NAA+NON-PROBE: NEGATIVE

## 2023-07-12 PROCEDURE — 2580000003 HC RX 258: Performed by: NURSE PRACTITIONER

## 2023-07-12 PROCEDURE — 6370000000 HC RX 637 (ALT 250 FOR IP): Performed by: INTERNAL MEDICINE

## 2023-07-12 PROCEDURE — 2580000003 HC RX 258: Performed by: INTERNAL MEDICINE

## 2023-07-12 PROCEDURE — 6360000002 HC RX W HCPCS: Performed by: STUDENT IN AN ORGANIZED HEALTH CARE EDUCATION/TRAINING PROGRAM

## 2023-07-12 PROCEDURE — 36591 DRAW BLOOD OFF VENOUS DEVICE: CPT

## 2023-07-12 PROCEDURE — C9113 INJ PANTOPRAZOLE SODIUM, VIA: HCPCS | Performed by: NURSE PRACTITIONER

## 2023-07-12 PROCEDURE — 82436 ASSAY OF URINE CHLORIDE: CPT

## 2023-07-12 PROCEDURE — 2060000000 HC ICU INTERMEDIATE R&B

## 2023-07-12 PROCEDURE — 36415 COLL VENOUS BLD VENIPUNCTURE: CPT

## 2023-07-12 PROCEDURE — 80048 BASIC METABOLIC PNL TOTAL CA: CPT

## 2023-07-12 PROCEDURE — 2500000003 HC RX 250 WO HCPCS: Performed by: INTERNAL MEDICINE

## 2023-07-12 PROCEDURE — 84300 ASSAY OF URINE SODIUM: CPT

## 2023-07-12 PROCEDURE — 6360000002 HC RX W HCPCS: Performed by: INTERNAL MEDICINE

## 2023-07-12 PROCEDURE — 6360000002 HC RX W HCPCS: Performed by: NURSE PRACTITIONER

## 2023-07-12 PROCEDURE — 2580000003 HC RX 258: Performed by: PHYSICIAN ASSISTANT

## 2023-07-12 PROCEDURE — 85025 COMPLETE CBC W/AUTO DIFF WBC: CPT

## 2023-07-12 PROCEDURE — 84484 ASSAY OF TROPONIN QUANT: CPT

## 2023-07-12 PROCEDURE — 82570 ASSAY OF URINE CREATININE: CPT

## 2023-07-12 PROCEDURE — 85018 HEMOGLOBIN: CPT

## 2023-07-12 PROCEDURE — A4216 STERILE WATER/SALINE, 10 ML: HCPCS | Performed by: NURSE PRACTITIONER

## 2023-07-12 RX ORDER — SODIUM BICARBONATE 650 MG/1
650 TABLET ORAL 4 TIMES DAILY
Status: DISPENSED | OUTPATIENT
Start: 2023-07-12 | End: 2023-07-13

## 2023-07-12 RX ORDER — SODIUM CHLORIDE 450 MG/100ML
INJECTION, SOLUTION INTRAVENOUS CONTINUOUS
Status: DISCONTINUED | OUTPATIENT
Start: 2023-07-12 | End: 2023-07-13

## 2023-07-12 RX ADMIN — SODIUM CHLORIDE 40 MG: 9 INJECTION INTRAMUSCULAR; INTRAVENOUS; SUBCUTANEOUS at 23:01

## 2023-07-12 RX ADMIN — DULOXETINE 30 MG: 30 CAPSULE, DELAYED RELEASE ORAL at 21:09

## 2023-07-12 RX ADMIN — SODIUM BICARBONATE 650 MG: 650 TABLET ORAL at 21:09

## 2023-07-12 RX ADMIN — SODIUM CHLORIDE: 4.5 INJECTION, SOLUTION INTRAVENOUS at 10:56

## 2023-07-12 RX ADMIN — SODIUM CHLORIDE, PRESERVATIVE FREE 10 ML: 5 INJECTION INTRAVENOUS at 07:49

## 2023-07-12 RX ADMIN — ONDANSETRON 4 MG: 2 INJECTION INTRAMUSCULAR; INTRAVENOUS at 22:32

## 2023-07-12 RX ADMIN — ONDANSETRON 4 MG: 2 INJECTION INTRAMUSCULAR; INTRAVENOUS at 06:11

## 2023-07-12 RX ADMIN — SODIUM BICARBONATE 650 MG: 650 TABLET ORAL at 16:31

## 2023-07-12 RX ADMIN — SODIUM BICARBONATE 650 MG: 650 TABLET ORAL at 07:49

## 2023-07-12 RX ADMIN — ASPIRIN 81 MG: 81 TABLET, CHEWABLE ORAL at 07:49

## 2023-07-12 RX ADMIN — HEPARIN SODIUM 5000 UNITS: 5000 INJECTION INTRAVENOUS; SUBCUTANEOUS at 06:08

## 2023-07-12 RX ADMIN — SODIUM CHLORIDE: 9 INJECTION, SOLUTION INTRAVENOUS at 04:55

## 2023-07-12 RX ADMIN — SODIUM BICARBONATE 50 MEQ: 84 INJECTION, SOLUTION INTRAVENOUS at 12:58

## 2023-07-12 RX ADMIN — SODIUM CHLORIDE 40 MG: 9 INJECTION INTRAMUSCULAR; INTRAVENOUS; SUBCUTANEOUS at 12:58

## 2023-07-12 RX ADMIN — PANTOPRAZOLE SODIUM 40 MG: 40 TABLET, DELAYED RELEASE ORAL at 07:48

## 2023-07-12 RX ADMIN — SODIUM BICARBONATE 650 MG: 650 TABLET ORAL at 12:59

## 2023-07-12 RX ADMIN — SODIUM CHLORIDE, PRESERVATIVE FREE 10 ML: 5 INJECTION INTRAVENOUS at 21:09

## 2023-07-12 RX ADMIN — SODIUM CHLORIDE: 4.5 INJECTION, SOLUTION INTRAVENOUS at 06:31

## 2023-07-12 RX ADMIN — SODIUM CHLORIDE: 4.5 INJECTION, SOLUTION INTRAVENOUS at 21:09

## 2023-07-12 RX ADMIN — ACETAMINOPHEN 650 MG: 325 TABLET ORAL at 14:46

## 2023-07-12 RX ADMIN — ACETAMINOPHEN 650 MG: 325 TABLET ORAL at 19:42

## 2023-07-12 RX ADMIN — CYANOCOBALAMIN TAB 500 MCG 1000 MCG: 500 TAB at 07:49

## 2023-07-12 ASSESSMENT — PAIN SCALES - GENERAL: PAINLEVEL_OUTOF10: 3

## 2023-07-12 ASSESSMENT — PAIN DESCRIPTION - DESCRIPTORS: DESCRIPTORS: ACHING

## 2023-07-12 ASSESSMENT — PAIN DESCRIPTION - LOCATION: LOCATION: HEAD

## 2023-07-12 NOTE — ED NOTES
Pt's systolic blood pressure ranging from high 70's to low 100's, has been maintaining low 80's with last few readings, ED provider updated, orders received and IVF started has ordered. Pt denies any s/s, remains AOX4, PWD.       Tori Capone, BK  07/11/23 1395

## 2023-07-12 NOTE — H&P
Hospitalist Admission Note    NAME:   Arielle Wade   : 1960   MRN: [id number]     Date/Time: 2023 8:36 PM    Patient PCP: Jamie Malone MD oncology/hematology= Dr. Guanaco Webster, nephrology= Dr. Adina Trujillo    ______________________________________________________________________  Given the patient's current clinical presentation, I have a high level of concern for decompensation if discharged from the emergency department. Complex decision making was performed, which includes reviewing the patient's available past medical records, laboratory results, and x-ray films. My assessment of this patient's clinical condition and my plan of care is as follows.     Assessment / Plan:    KEANU on CKD stage III POA-likely prerenal due to nausea vomiting and diarrhea  Nausea vomiting with diarrhea POA  History of multiple myeloma not on chemotherapy currently but being planned to restart based on recent PET scan  Hypotension POA-responded to IV fluid challenge in ED  History of hypertension on multiple anti-hypertensive meds  Chronic thrombocytopenia  Creatinine 4.6, baseline creatinine less than 2.0  Magnesium 2.6  Source 4.7  Ten 0.69  Initial blood culture pending  X-ray negative acute  CT abdomen and pelvis negative acute  Platelets 75    Admit to stepdown unit bed-at risk for further hypotension requiring pressor support-peripheral pressors as needed  Status post 3 L normal saline bolus in ED, continue maintenance IV fluid at 150 mill an hour  Check BMP in a.m. for creatinine  Check renal ultrasound to rule out hydro/obstructive uropathy  Hold all BP meds and diuretics  Continue home sodium bicarbonate twice daily  Symptomatic treatment for nausea vomiting with Zofran  Inpatient nephrology consult Dr. Adina Trujillo for further recommendations  No role for any empiric antibiotics at this point of time, for any fever trend  Can consider empiric IV antibiotics if pressor need arises despite fluid

## 2023-07-12 NOTE — ED NOTES
Assumed care of pt. Pt resting in room-reports still unable to void at this time. Pt hypotensive on arrival to room-cuff readjusted with improved blood pressure. Pt AOX4, PWD, reports nausea-provider notified.       Rosa M Andrade RN  07/11/23 6768

## 2023-07-12 NOTE — CARE COORDINATION
Care Management Initial Assessment       RUR:16%  Readmission? No  1st IM letter given? Yes - 7/11/23  1st  letter given: No       07/12/23 7825   Service Assessment   Patient Orientation Alert and Oriented   Cognition Alert   History Provided By Patient   Primary [address] None   Patient's Healthcare Decision Maker is: Legal Next of Kin   PCP Verified by CM Yes   Last Visit to PCP Within last 3 months   Prior Functional Level Independent in ADLs/IADLs   Current Functional Level Independent in ADLs/IADLs   Can patient return to prior living arrangement Yes   Ability to make needs known: Good   Family able to assist with home care needs: Yes   Would you like for me to discuss the discharge plan with any other family members/significant others, and if so, who? No   Social/Functional History   Lives With Spouse   Type of Home Mobile home   Home Layout One level   [address] to [address] with [address]   Ambulation Assistance Independent   Transfer Assistance Independent   Active  Yes   Mode of Transportation Car   Discharge Planning   Type of Residence Trailer/Mobile Home   Living Arrangements Spouse/Significant Other;Children     CM met with patient at bedside. Introduced self and role of CM. Demographics, Pharmacy and PCP confirmed. Pt lives in mobile home with spouse and child. Independent with ADL's prior to admission, expecting to discharge home without services. Denies any DME and/or home health services prior to admission.  to transport on discharge. CM to continue to follow for any discharge needs that may arise.

## 2023-07-13 ENCOUNTER — ANESTHESIA EVENT (OUTPATIENT)
Facility: HOSPITAL | Age: 63
End: 2023-07-13
Payer: MEDICARE

## 2023-07-13 ENCOUNTER — ANESTHESIA (OUTPATIENT)
Facility: HOSPITAL | Age: 63
End: 2023-07-13
Payer: MEDICARE

## 2023-07-13 LAB
ALBUMIN SERPL-MCNC: 2.6 G/DL (ref 3.5–5)
ANION GAP SERPL CALC-SCNC: 10 MMOL/L (ref 5–15)
BUN SERPL-MCNC: 53 MG/DL (ref 6–20)
BUN/CREAT SERPL: 21 (ref 12–20)
CALCIUM SERPL-MCNC: 6.5 MG/DL (ref 8.5–10.1)
CHLORIDE SERPL-SCNC: 120 MMOL/L (ref 97–108)
CO2 SERPL-SCNC: 14 MMOL/L (ref 21–32)
CREAT SERPL-MCNC: 2.55 MG/DL (ref 0.55–1.02)
ERYTHROCYTE [DISTWIDTH] IN BLOOD BY AUTOMATED COUNT: 14 % (ref 11.5–14.5)
GLUCOSE SERPL-MCNC: 74 MG/DL (ref 65–100)
HCT VFR BLD AUTO: 31.3 % (ref 35–47)
HGB BLD-MCNC: 10.2 G/DL (ref 11.5–16)
MCH RBC QN AUTO: 32.2 PG (ref 26–34)
MCHC RBC AUTO-ENTMCNC: 32.6 G/DL (ref 30–36.5)
MCV RBC AUTO: 98.7 FL (ref 80–99)
NRBC # BLD: 0 K/UL (ref 0–0.01)
NRBC BLD-RTO: 0 PER 100 WBC
PHOSPHATE SERPL-MCNC: 2.6 MG/DL (ref 2.6–4.7)
PLATELET # BLD AUTO: 73 K/UL (ref 150–400)
PMV BLD AUTO: 9.5 FL (ref 8.9–12.9)
POTASSIUM SERPL-SCNC: 3.3 MMOL/L (ref 3.5–5.1)
RBC # BLD AUTO: 3.17 M/UL (ref 3.8–5.2)
SODIUM SERPL-SCNC: 144 MMOL/L (ref 136–145)
WBC # BLD AUTO: 3.1 K/UL (ref 3.6–11)

## 2023-07-13 PROCEDURE — 6360000002 HC RX W HCPCS: Performed by: ANESTHESIOLOGY

## 2023-07-13 PROCEDURE — 6370000000 HC RX 637 (ALT 250 FOR IP): Performed by: INTERNAL MEDICINE

## 2023-07-13 PROCEDURE — 85027 COMPLETE CBC AUTOMATED: CPT

## 2023-07-13 PROCEDURE — 6360000002 HC RX W HCPCS: Performed by: INTERNAL MEDICINE

## 2023-07-13 PROCEDURE — 80069 RENAL FUNCTION PANEL: CPT

## 2023-07-13 PROCEDURE — 2060000000 HC ICU INTERMEDIATE R&B

## 2023-07-13 PROCEDURE — 2500000003 HC RX 250 WO HCPCS: Performed by: INTERNAL MEDICINE

## 2023-07-13 PROCEDURE — 2580000003 HC RX 258: Performed by: INTERNAL MEDICINE

## 2023-07-13 PROCEDURE — 6370000000 HC RX 637 (ALT 250 FOR IP): Performed by: NURSE PRACTITIONER

## 2023-07-13 PROCEDURE — 36415 COLL VENOUS BLD VENIPUNCTURE: CPT

## 2023-07-13 PROCEDURE — 3600007512: Performed by: INTERNAL MEDICINE

## 2023-07-13 PROCEDURE — 88305 TISSUE EXAM BY PATHOLOGIST: CPT

## 2023-07-13 PROCEDURE — 6360000002 HC RX W HCPCS: Performed by: NURSE PRACTITIONER

## 2023-07-13 PROCEDURE — 0DB68ZX EXCISION OF STOMACH, VIA NATURAL OR ARTIFICIAL OPENING ENDOSCOPIC, DIAGNOSTIC: ICD-10-PCS | Performed by: INTERNAL MEDICINE

## 2023-07-13 PROCEDURE — 0DB98ZX EXCISION OF DUODENUM, VIA NATURAL OR ARTIFICIAL OPENING ENDOSCOPIC, DIAGNOSTIC: ICD-10-PCS | Performed by: INTERNAL MEDICINE

## 2023-07-13 PROCEDURE — 6370000000 HC RX 637 (ALT 250 FOR IP): Performed by: STUDENT IN AN ORGANIZED HEALTH CARE EDUCATION/TRAINING PROGRAM

## 2023-07-13 PROCEDURE — 3700000000 HC ANESTHESIA ATTENDED CARE: Performed by: INTERNAL MEDICINE

## 2023-07-13 PROCEDURE — 2580000003 HC RX 258: Performed by: NURSE PRACTITIONER

## 2023-07-13 PROCEDURE — A4216 STERILE WATER/SALINE, 10 ML: HCPCS | Performed by: NURSE PRACTITIONER

## 2023-07-13 PROCEDURE — 3600007502: Performed by: INTERNAL MEDICINE

## 2023-07-13 PROCEDURE — 2500000003 HC RX 250 WO HCPCS: Performed by: ANESTHESIOLOGY

## 2023-07-13 PROCEDURE — 2709999900 HC NON-CHARGEABLE SUPPLY: Performed by: INTERNAL MEDICINE

## 2023-07-13 PROCEDURE — 7100000010 HC PHASE II RECOVERY - FIRST 15 MIN: Performed by: INTERNAL MEDICINE

## 2023-07-13 PROCEDURE — C9113 INJ PANTOPRAZOLE SODIUM, VIA: HCPCS | Performed by: NURSE PRACTITIONER

## 2023-07-13 PROCEDURE — 3700000001 HC ADD 15 MINUTES (ANESTHESIA): Performed by: INTERNAL MEDICINE

## 2023-07-13 RX ORDER — POTASSIUM CHLORIDE 750 MG/1
40 TABLET, FILM COATED, EXTENDED RELEASE ORAL ONCE
Status: DISCONTINUED | OUTPATIENT
Start: 2023-07-13 | End: 2023-07-13

## 2023-07-13 RX ORDER — SODIUM CHLORIDE 0.9 % (FLUSH) 0.9 %
5-40 SYRINGE (ML) INJECTION PRN
Status: DISCONTINUED | OUTPATIENT
Start: 2023-07-13 | End: 2023-07-13 | Stop reason: HOSPADM

## 2023-07-13 RX ORDER — AZITHROMYCIN 250 MG/1
500 TABLET, FILM COATED ORAL DAILY
Status: DISCONTINUED | OUTPATIENT
Start: 2023-07-13 | End: 2023-07-14 | Stop reason: HOSPADM

## 2023-07-13 RX ORDER — AMLODIPINE BESYLATE 5 MG/1
5 TABLET ORAL DAILY
Status: DISCONTINUED | OUTPATIENT
Start: 2023-07-13 | End: 2023-07-14 | Stop reason: HOSPADM

## 2023-07-13 RX ORDER — SODIUM CHLORIDE 9 MG/ML
25 INJECTION, SOLUTION INTRAVENOUS PRN
Status: DISCONTINUED | OUTPATIENT
Start: 2023-07-13 | End: 2023-07-13 | Stop reason: HOSPADM

## 2023-07-13 RX ORDER — SODIUM CHLORIDE 0.9 % (FLUSH) 0.9 %
5-40 SYRINGE (ML) INJECTION EVERY 12 HOURS SCHEDULED
Status: DISCONTINUED | OUTPATIENT
Start: 2023-07-13 | End: 2023-07-13 | Stop reason: HOSPADM

## 2023-07-13 RX ORDER — SODIUM CHLORIDE, SODIUM LACTATE, POTASSIUM CHLORIDE, CALCIUM CHLORIDE 600; 310; 30; 20 MG/100ML; MG/100ML; MG/100ML; MG/100ML
INJECTION, SOLUTION INTRAVENOUS CONTINUOUS
Status: DISCONTINUED | OUTPATIENT
Start: 2023-07-13 | End: 2023-07-14

## 2023-07-13 RX ORDER — LIDOCAINE HYDROCHLORIDE 20 MG/ML
INJECTION, SOLUTION EPIDURAL; INFILTRATION; INTRACAUDAL; PERINEURAL PRN
Status: DISCONTINUED | OUTPATIENT
Start: 2023-07-13 | End: 2023-07-13 | Stop reason: SDUPTHER

## 2023-07-13 RX ORDER — POTASSIUM CHLORIDE 750 MG/1
40 TABLET, FILM COATED, EXTENDED RELEASE ORAL ONCE
Status: COMPLETED | OUTPATIENT
Start: 2023-07-13 | End: 2023-07-13

## 2023-07-13 RX ORDER — ZOLPIDEM TARTRATE 5 MG/1
10 TABLET ORAL NIGHTLY PRN
Status: DISCONTINUED | OUTPATIENT
Start: 2023-07-13 | End: 2023-07-14 | Stop reason: HOSPADM

## 2023-07-13 RX ADMIN — SODIUM CHLORIDE, PRESERVATIVE FREE 10 ML: 5 INJECTION INTRAVENOUS at 20:30

## 2023-07-13 RX ADMIN — SODIUM CHLORIDE 40 MG: 9 INJECTION INTRAMUSCULAR; INTRAVENOUS; SUBCUTANEOUS at 14:06

## 2023-07-13 RX ADMIN — PROPOFOL 180 MG: 10 INJECTION, EMULSION INTRAVENOUS at 11:50

## 2023-07-13 RX ADMIN — CYANOCOBALAMIN TAB 500 MCG 1000 MCG: 500 TAB at 14:05

## 2023-07-13 RX ADMIN — ONDANSETRON 4 MG: 2 INJECTION INTRAMUSCULAR; INTRAVENOUS at 05:15

## 2023-07-13 RX ADMIN — SODIUM CHLORIDE, PRESERVATIVE FREE 10 ML: 5 INJECTION INTRAVENOUS at 09:34

## 2023-07-13 RX ADMIN — SODIUM CHLORIDE 25 ML: 9 INJECTION, SOLUTION INTRAVENOUS at 11:00

## 2023-07-13 RX ADMIN — AMLODIPINE BESYLATE 5 MG: 5 TABLET ORAL at 17:02

## 2023-07-13 RX ADMIN — ACETAMINOPHEN 650 MG: 325 TABLET ORAL at 05:18

## 2023-07-13 RX ADMIN — SODIUM BICARBONATE 100 MEQ: 84 INJECTION, SOLUTION INTRAVENOUS at 09:11

## 2023-07-13 RX ADMIN — SODIUM CHLORIDE, POTASSIUM CHLORIDE, SODIUM LACTATE AND CALCIUM CHLORIDE: 600; 310; 30; 20 INJECTION, SOLUTION INTRAVENOUS at 09:12

## 2023-07-13 RX ADMIN — SODIUM BICARBONATE 650 MG: 650 TABLET ORAL at 14:05

## 2023-07-13 RX ADMIN — DULOXETINE 30 MG: 30 CAPSULE, DELAYED RELEASE ORAL at 20:12

## 2023-07-13 RX ADMIN — SODIUM BICARBONATE 650 MG: 650 TABLET ORAL at 17:02

## 2023-07-13 RX ADMIN — ACETAMINOPHEN 650 MG: 325 TABLET ORAL at 20:12

## 2023-07-13 RX ADMIN — SODIUM CHLORIDE, POTASSIUM CHLORIDE, SODIUM LACTATE AND CALCIUM CHLORIDE: 600; 310; 30; 20 INJECTION, SOLUTION INTRAVENOUS at 20:13

## 2023-07-13 RX ADMIN — LIDOCAINE HYDROCHLORIDE 80 MG: 20 INJECTION, SOLUTION EPIDURAL; INFILTRATION; INTRACAUDAL; PERINEURAL at 11:41

## 2023-07-13 RX ADMIN — POTASSIUM CHLORIDE 40 MEQ: 750 TABLET, FILM COATED, EXTENDED RELEASE ORAL at 14:05

## 2023-07-13 RX ADMIN — AZITHROMYCIN 500 MG: 250 TABLET, FILM COATED ORAL at 14:04

## 2023-07-13 RX ADMIN — ASPIRIN 81 MG: 81 TABLET, CHEWABLE ORAL at 14:04

## 2023-07-13 ASSESSMENT — PAIN DESCRIPTION - LOCATION: LOCATION: HEAD

## 2023-07-13 ASSESSMENT — PAIN DESCRIPTION - ORIENTATION: ORIENTATION: MID

## 2023-07-13 ASSESSMENT — PAIN - FUNCTIONAL ASSESSMENT: PAIN_FUNCTIONAL_ASSESSMENT: NONE - DENIES PAIN

## 2023-07-13 ASSESSMENT — PAIN DESCRIPTION - DESCRIPTORS: DESCRIPTORS: ACHING

## 2023-07-13 ASSESSMENT — PAIN SCALES - GENERAL
PAINLEVEL_OUTOF10: 4
PAINLEVEL_OUTOF10: 0

## 2023-07-13 NOTE — ANESTHESIA PRE PROCEDURE
Department of Anesthesiology  Preprocedure Note       Name:  Bre Ricci   Age:  70 y.o.  :  1960                                          MRN:  [id number]         Date:  2023      Surgeon: yR Blas):  Vilma Archuleta MD    Procedure: Procedure(s):  EGD ESOPHAGOGASTRODUODENOSCOPY    Medications prior to admission:   Prior to Admission medications    Medication Sig Start Date End Date Taking? Authorizing Provider   DULoxetine (CYMBALTA) 30 MG extended release capsule Take 1 capsule by mouth nightly 23  Yes Historical Provider, MD   sodium bicarbonate 650 MG tablet Take 1 tablet by mouth 2 times daily 4/10/23  Yes Historical Provider, MD   bumetanide (BUMEX) 1 MG tablet Take 1 tablet by mouth every other day  Patient not taking: Reported on 2023    Historical Provider, MD   dilTIAZem (CARDIZEM) 60 MG tablet Take 1 tablet 3 times a day by oral route. Historical Provider, MD   dexlansoprazole (DEXILANT) 60 MG CPDR delayed release capsule Take 1 capsule every day by oral route for 56 days. Patient not taking: Reported on 2023    Historical Provider, MD   LORazepam (ATIVAN) 1 MG tablet Take 1 tablet by mouth 3 times daily as needed. 23   Historical Provider, MD   losartan (COZAAR) 25 MG tablet  23   Historical Provider, MD   acyclovir (ZOVIRAX) 200 MG capsule Take 1 capsule by mouth 2 times daily 22   Ar Automatic Reconciliation   amLODIPine (NORVASC) 5 MG tablet Take by mouth daily 10/28/22 10/28/23  Ar Automatic Reconciliation   aspirin 81 MG chewable tablet Take by mouth daily 22   Ar Automatic Reconciliation   cyanocobalamin 1000 MCG tablet Take by mouth daily  Patient not taking: Reported on 2023    Ar Automatic Reconciliation   HYDROcodone-acetaminophen (NORCO) 7.5-325 MG per tablet Take 1 tablet by mouth 4 times daily as needed.  22   Ar Automatic Reconciliation   nebivolol (BYSTOLIC) 5 MG tablet Take by mouth daily  Patient not taking:

## 2023-07-13 NOTE — OP NOTE
[facility] Office: [phone]      Esophagogastroduodenoscopy Procedure Note      Susan Rodríguez  1960  [id number]    Indication:  Nausea,vomiting and diarrhea      : Faustina Hoskins MD    Referring Provider:  Barbara Craft MD    Sedation:  MAC anesthesia Propofol    Procedure Details:  After detailed informed consent was obtained for the procedure, with all risks and benefits of procedure explained the patient was taken to the endoscopy suite and placed in the left lateral decubitus position. Following sequential administration of sedation as per above, the endoscope was inserted into the mouth and advanced under direct vision to proximal jejunum. A careful inspection was made as the gastroscope was withdrawn, including a retroflexed view of the proximal stomach; findings and interventions are described below. Findings:     Esophagus: The esophageal mucosa in the proximal, mid and distal esophagus is normal.   The squamo-columnar junction is at 40 cm where the Z-line was noted. Stomach:   She has a hx of a bariatric gastric bypass. The gastric  pouch is 5-6  cm long with mild mucosal erythema: biopsies taken. The gastrojejunal anastomosis has mild nodularity(non specific) with mild erythema. No ulceration is noted. Biopsies taken. Jejunum   The folds are normal to 70 cm. Mucosal biopsies taken(diarrhea)    Therapies:    biopsy of stomach pouch and gastrojejunal anastomosis  biopsy of  jejunum    Specimen: Specimens were collected as described and send to the laboratory. Complications:   None were encountered during the procedure. EBL:  None. Recommendations:     -Continue acid suppression. ,   -Await pathology. ,   -Follow symptoms.  -Findings d/w April Washington, pt's . Iram Guzmán MD  7/13/2023  11:50 AM

## 2023-07-13 NOTE — ANESTHESIA POSTPROCEDURE EVALUATION
Department of Anesthesiology  Postprocedure Note    Patient: Tabby Osman  MRN: [id number]  YOB: 1960  Date of evaluation: 7/13/2023      Procedure Summary     Date: 07/13/23 Room / Location: [facility] ENDO [facility] / hospitals ENDOSCOPY    Anesthesia Start: 9684 Anesthesia Stop: 9672    Procedure: EGD ESOPHAGOGASTRODUODENOSCOPY (Upper GI Region) Diagnosis:       Anemia, unspecified type      (Anemia, unspecified type [D64.9])    Surgeons: Maude Murdock MD Responsible Provider: Olivia Tamayo DO    Anesthesia Type: TIVA ASA Status: 3          Anesthesia Type: TIVA    Trino Phase I: Trino Score: 10    Trino Phase II: Trino Score: 10      Anesthesia Post Evaluation    Patient location during evaluation: PACU  Patient participation: complete - patient participated  Level of consciousness: awake  Airway patency: patent  Nausea & Vomiting: no vomiting and no nausea  Complications: no  Cardiovascular status: hemodynamically stable  Respiratory status: acceptable  Hydration status: euvolemic

## 2023-07-14 VITALS
SYSTOLIC BLOOD PRESSURE: 153 MMHG | WEIGHT: 164 LBS | HEART RATE: 89 BPM | OXYGEN SATURATION: 97 % | DIASTOLIC BLOOD PRESSURE: 94 MMHG | HEIGHT: 66 IN | BODY MASS INDEX: 26.36 KG/M2 | TEMPERATURE: 98 F | RESPIRATION RATE: 16 BRPM

## 2023-07-14 LAB
ALBUMIN SERPL-MCNC: 2.8 G/DL (ref 3.5–5)
ANION GAP SERPL CALC-SCNC: 10 MMOL/L (ref 5–15)
BUN SERPL-MCNC: 32 MG/DL (ref 6–20)
BUN/CREAT SERPL: 17 (ref 12–20)
CALCIUM SERPL-MCNC: 7.5 MG/DL (ref 8.5–10.1)
CHLORIDE SERPL-SCNC: 115 MMOL/L (ref 97–108)
CO2 SERPL-SCNC: 22 MMOL/L (ref 21–32)
CREAT SERPL-MCNC: 1.93 MG/DL (ref 0.55–1.02)
ERYTHROCYTE [DISTWIDTH] IN BLOOD BY AUTOMATED COUNT: 13.7 % (ref 11.5–14.5)
GLUCOSE SERPL-MCNC: 91 MG/DL (ref 65–100)
HCT VFR BLD AUTO: 30.5 % (ref 35–47)
HGB BLD-MCNC: 10.4 G/DL (ref 11.5–16)
MCH RBC QN AUTO: 33 PG (ref 26–34)
MCHC RBC AUTO-ENTMCNC: 34.1 G/DL (ref 30–36.5)
MCV RBC AUTO: 96.8 FL (ref 80–99)
NRBC # BLD: 0 K/UL (ref 0–0.01)
NRBC BLD-RTO: 0 PER 100 WBC
PHOSPHATE SERPL-MCNC: 1.5 MG/DL (ref 2.6–4.7)
PLATELET # BLD AUTO: 85 K/UL (ref 150–400)
PMV BLD AUTO: 9.3 FL (ref 8.9–12.9)
POTASSIUM SERPL-SCNC: 3.3 MMOL/L (ref 3.5–5.1)
RBC # BLD AUTO: 3.15 M/UL (ref 3.8–5.2)
SODIUM SERPL-SCNC: 147 MMOL/L (ref 136–145)
WBC # BLD AUTO: 3.3 K/UL (ref 3.6–11)

## 2023-07-14 PROCEDURE — C9113 INJ PANTOPRAZOLE SODIUM, VIA: HCPCS | Performed by: NURSE PRACTITIONER

## 2023-07-14 PROCEDURE — 2580000003 HC RX 258: Performed by: NURSE PRACTITIONER

## 2023-07-14 PROCEDURE — 2580000003 HC RX 258: Performed by: INTERNAL MEDICINE

## 2023-07-14 PROCEDURE — 6370000000 HC RX 637 (ALT 250 FOR IP): Performed by: NURSE PRACTITIONER

## 2023-07-14 PROCEDURE — 85027 COMPLETE CBC AUTOMATED: CPT

## 2023-07-14 PROCEDURE — 6370000000 HC RX 637 (ALT 250 FOR IP): Performed by: INTERNAL MEDICINE

## 2023-07-14 PROCEDURE — 6360000002 HC RX W HCPCS: Performed by: NURSE PRACTITIONER

## 2023-07-14 PROCEDURE — 36415 COLL VENOUS BLD VENIPUNCTURE: CPT

## 2023-07-14 PROCEDURE — 6360000002 HC RX W HCPCS: Performed by: STUDENT IN AN ORGANIZED HEALTH CARE EDUCATION/TRAINING PROGRAM

## 2023-07-14 PROCEDURE — 80069 RENAL FUNCTION PANEL: CPT

## 2023-07-14 PROCEDURE — 6370000000 HC RX 637 (ALT 250 FOR IP): Performed by: STUDENT IN AN ORGANIZED HEALTH CARE EDUCATION/TRAINING PROGRAM

## 2023-07-14 PROCEDURE — A4216 STERILE WATER/SALINE, 10 ML: HCPCS | Performed by: NURSE PRACTITIONER

## 2023-07-14 RX ORDER — HEPARIN SODIUM 100 [USP'U]/ML
3 INJECTION, SOLUTION INTRAVENOUS PRN
Status: DISCONTINUED | OUTPATIENT
Start: 2023-07-14 | End: 2023-07-14 | Stop reason: HOSPADM

## 2023-07-14 RX ORDER — PANTOPRAZOLE SODIUM 40 MG/1
40 TABLET, DELAYED RELEASE ORAL
Qty: 90 TABLET | Refills: 1 | Status: SHIPPED | OUTPATIENT
Start: 2023-07-14

## 2023-07-14 RX ORDER — POTASSIUM CHLORIDE 750 MG/1
40 TABLET, FILM COATED, EXTENDED RELEASE ORAL ONCE
Status: COMPLETED | OUTPATIENT
Start: 2023-07-14 | End: 2023-07-14

## 2023-07-14 RX ADMIN — SODIUM CHLORIDE, PRESERVATIVE FREE 10 ML: 5 INJECTION INTRAVENOUS at 09:43

## 2023-07-14 RX ADMIN — POTASSIUM CHLORIDE 40 MEQ: 750 TABLET, FILM COATED, EXTENDED RELEASE ORAL at 09:42

## 2023-07-14 RX ADMIN — AZITHROMYCIN 500 MG: 250 TABLET, FILM COATED ORAL at 09:41

## 2023-07-14 RX ADMIN — SODIUM CHLORIDE 40 MG: 9 INJECTION INTRAMUSCULAR; INTRAVENOUS; SUBCUTANEOUS at 04:00

## 2023-07-14 RX ADMIN — AMLODIPINE BESYLATE 5 MG: 5 TABLET ORAL at 09:41

## 2023-07-14 RX ADMIN — HEPARIN 300 UNITS: 100 SYRINGE at 12:10

## 2023-07-14 RX ADMIN — ASPIRIN 81 MG: 81 TABLET, CHEWABLE ORAL at 09:42

## 2023-07-14 RX ADMIN — CYANOCOBALAMIN TAB 500 MCG 1000 MCG: 500 TAB at 09:42

## 2023-07-14 RX ADMIN — SODIUM CHLORIDE, POTASSIUM CHLORIDE, SODIUM LACTATE AND CALCIUM CHLORIDE: 600; 310; 30; 20 INJECTION, SOLUTION INTRAVENOUS at 06:22

## 2023-07-14 ASSESSMENT — PAIN SCALES - GENERAL: PAINLEVEL_OUTOF10: 0

## 2023-07-14 NOTE — DISCHARGE INSTRUCTIONS
-Avoid hot and spicy foods , avoid alcohol and smoking   -Continue pantoprazole for acid suppression   -Follow up with GI for biopsy results   -May need colonoscopy as outpatient   - If fresh blood per rectum or vomiting of blood seen - please come to ED as soon as possible .

## 2023-07-14 NOTE — CARE COORDINATION
Pt is cleared for d/c from a CM standpoint. Transition of Care Plan:    RUR: 14%   Prior Level of Functioning: independent  Disposition: home with spouse  If SNF or IPR: Date FOC offered:   Date FOC received:   Accepting facility:   Date authorization started with reference number:   Date authorization received and expires: Follow up appointments: PCP, [facility]  DME needed: none  Transportation at discharge:   IM/IMM Medicare/ letter given: provided  Is patient a  and connected with [place]? If yes, was [employer] transfer form completed and [facility] notified? Caregiver Contact: Thamas Spurling (Spouse)   [phone] (Mobile)  Discharge Caregiver contacted prior to discharge? CM will contact prior to d/c if pt desires. Care Conference needed? Barriers to discharge:     CM acknowledged d/c order. CM met with pt at bedside to discuss. Pt in agreement. 2IM reviewed, signed and copy placed on chart. Pt's spouse will transport pt home.   CM called Dr. Delroy Mcmillan office and his staff will call pt directly to schedule.         07/14/23 22188  27 Discharge   Transition of Care Consult (CM Consult) Discharge Planning   Services At/After Discharge None   Mode of Transport at Discharge Other (see comment)  (spouse)   Confirm Follow Up Transport Self       Varun Sena LMSW  Supervisee in [facility], [address] [place] [address]

## 2023-07-16 LAB
BACTERIA SPEC CULT: NORMAL
BACTERIA SPEC CULT: NORMAL
SERVICE CMNT-IMP: NORMAL
SERVICE CMNT-IMP: NORMAL

## 2024-06-13 ENCOUNTER — HOSPITAL ENCOUNTER (OUTPATIENT)
Facility: HOSPITAL | Age: 64
Discharge: HOME | End: 2024-06-13
Attending: INTERNAL MEDICINE
Payer: MEDICARE

## 2024-06-13 ENCOUNTER — TRANSCRIBE ORDERS (OUTPATIENT)
Facility: HOSPITAL | Age: 64
End: 2024-06-13

## 2024-06-13 DIAGNOSIS — R22.41 LOCALIZED SWELLING, MASS AND LUMP, LOWER LIMB, RIGHT: Primary | ICD-10-CM

## 2024-06-13 DIAGNOSIS — R22.41 LOCALIZED SWELLING, MASS AND LUMP, LOWER LIMB, RIGHT: ICD-10-CM

## 2024-06-13 PROCEDURE — 93971 EXTREMITY STUDY: CPT

## 2025-07-23 ENCOUNTER — APPOINTMENT (OUTPATIENT)
Facility: HOSPITAL | Age: 65
End: 2025-07-23
Payer: MEDICARE

## 2025-07-23 ENCOUNTER — HOSPITAL ENCOUNTER (EMERGENCY)
Facility: HOSPITAL | Age: 65
Discharge: HOME | End: 2025-07-23
Attending: EMERGENCY MEDICINE
Payer: MEDICARE

## 2025-07-23 VITALS
BODY MASS INDEX: 28.59 KG/M2 | SYSTOLIC BLOOD PRESSURE: 130 MMHG | WEIGHT: 177.91 LBS | DIASTOLIC BLOOD PRESSURE: 79 MMHG | TEMPERATURE: 98 F | HEIGHT: 66 IN | OXYGEN SATURATION: 99 % | HEART RATE: 82 BPM | RESPIRATION RATE: 18 BRPM

## 2025-07-23 DIAGNOSIS — R22.43 LOCALIZED SWELLING OF BOTH LOWER LEGS: Primary | ICD-10-CM

## 2025-07-23 DIAGNOSIS — N18.4 STAGE 4 CHRONIC KIDNEY DISEASE (HCC): ICD-10-CM

## 2025-07-23 LAB
ALBUMIN SERPL-MCNC: 3.8 G/DL (ref 3.5–5)
ALBUMIN/GLOB SERPL: 1.4 (ref 1.1–2.2)
ALP SERPL-CCNC: 52 U/L (ref 45–117)
ALT SERPL-CCNC: 21 U/L (ref 12–78)
ANION GAP SERPL CALC-SCNC: 3 MMOL/L (ref 2–12)
AST SERPL-CCNC: 29 U/L (ref 15–37)
BASOPHILS # BLD: 0.02 K/UL (ref 0–0.1)
BASOPHILS NFR BLD: 0.6 % (ref 0–1)
BILIRUB SERPL-MCNC: 0.7 MG/DL (ref 0.2–1)
BUN SERPL-MCNC: 40 MG/DL (ref 6–20)
BUN/CREAT SERPL: 16 (ref 12–20)
CALCIUM SERPL-MCNC: 9.2 MG/DL (ref 8.5–10.1)
CHLORIDE SERPL-SCNC: 114 MMOL/L (ref 97–108)
CO2 SERPL-SCNC: 25 MMOL/L (ref 21–32)
CREAT SERPL-MCNC: 2.53 MG/DL (ref 0.55–1.02)
DIFFERENTIAL METHOD BLD: ABNORMAL
ECHO BSA: 1.94 M2
EOSINOPHIL # BLD: 0.02 K/UL (ref 0–0.4)
EOSINOPHIL NFR BLD: 0.6 % (ref 0–7)
ERYTHROCYTE [DISTWIDTH] IN BLOOD BY AUTOMATED COUNT: 14.2 % (ref 11.5–14.5)
GLOBULIN SER CALC-MCNC: 2.7 G/DL (ref 2–4)
GLUCOSE SERPL-MCNC: 80 MG/DL (ref 65–100)
HCT VFR BLD AUTO: 33.6 % (ref 35–47)
HGB BLD-MCNC: 10.5 G/DL (ref 11.5–16)
IMM GRANULOCYTES # BLD AUTO: 0.02 K/UL (ref 0–0.04)
IMM GRANULOCYTES NFR BLD AUTO: 0.6 % (ref 0–0.5)
LYMPHOCYTES # BLD: 1.05 K/UL (ref 0.8–3.5)
LYMPHOCYTES NFR BLD: 30.5 % (ref 12–49)
MCH RBC QN AUTO: 30.7 PG (ref 26–34)
MCHC RBC AUTO-ENTMCNC: 31.3 G/DL (ref 30–36.5)
MCV RBC AUTO: 98.2 FL (ref 80–99)
MONOCYTES # BLD: 0.3 K/UL (ref 0–1)
MONOCYTES NFR BLD: 8.7 % (ref 5–13)
NEUTS SEG # BLD: 2.03 K/UL (ref 1.8–8)
NEUTS SEG NFR BLD: 59 % (ref 32–75)
NRBC # BLD: 0 K/UL (ref 0–0.01)
NRBC BLD-RTO: 0 PER 100 WBC
PLATELET # BLD AUTO: 77 K/UL (ref 150–400)
PMV BLD AUTO: 10 FL (ref 8.9–12.9)
POTASSIUM SERPL-SCNC: 4.5 MMOL/L (ref 3.5–5.1)
PROT SERPL-MCNC: 6.5 G/DL (ref 6.4–8.2)
RBC # BLD AUTO: 3.42 M/UL (ref 3.8–5.2)
SODIUM SERPL-SCNC: 142 MMOL/L (ref 136–145)
WBC # BLD AUTO: 3.4 K/UL (ref 3.6–11)

## 2025-07-23 PROCEDURE — 93970 EXTREMITY STUDY: CPT

## 2025-07-23 PROCEDURE — 36415 COLL VENOUS BLD VENIPUNCTURE: CPT

## 2025-07-23 PROCEDURE — 80053 COMPREHEN METABOLIC PANEL: CPT

## 2025-07-23 PROCEDURE — 85025 COMPLETE CBC W/AUTO DIFF WBC: CPT

## 2025-07-23 PROCEDURE — 99284 EMERGENCY DEPT VISIT MOD MDM: CPT

## 2025-07-23 ASSESSMENT — PAIN DESCRIPTION - ORIENTATION: ORIENTATION: RIGHT;LEFT

## 2025-07-23 ASSESSMENT — PAIN SCALES - GENERAL: PAINLEVEL_OUTOF10: 6

## 2025-07-23 ASSESSMENT — PAIN - FUNCTIONAL ASSESSMENT: PAIN_FUNCTIONAL_ASSESSMENT: 0-10

## 2025-07-23 ASSESSMENT — PAIN DESCRIPTION - DESCRIPTORS: DESCRIPTORS: CRAMPING

## 2025-07-23 ASSESSMENT — PAIN DESCRIPTION - LOCATION: LOCATION: LEG

## 2025-07-23 NOTE — ED PROVIDER NOTES
8.9 - 12.9 FL    Nucleated RBCs 0.0 0  WBC    nRBC 0.00 0.00 - 0.01 K/uL    Neutrophils % 59.0 32.0 - 75.0 %    Lymphocytes % 30.5 12.0 - 49.0 %    Monocytes % 8.7 5.0 - 13.0 %    Eosinophils % 0.6 0.0 - 7.0 %    Basophils % 0.6 0.0 - 1.0 %    Immature Granulocytes % 0.6 (H) 0.0 - 0.5 %    Neutrophils Absolute 2.03 1.80 - 8.00 K/UL    Lymphocytes Absolute 1.05 0.80 - 3.50 K/UL    Monocytes Absolute 0.30 0.00 - 1.00 K/UL    Eosinophils Absolute 0.02 0.00 - 0.40 K/UL    Basophils Absolute 0.02 0.00 - 0.10 K/UL    Immature Granulocytes Absolute 0.02 0.00 - 0.04 K/UL    Differential Type AUTOMATED     Comprehensive Metabolic Panel   Result Value Ref Range    Sodium 142 136 - 145 mmol/L    Potassium 4.5 3.5 - 5.1 mmol/L    Chloride 114 (H) 97 - 108 mmol/L    CO2 25 21 - 32 mmol/L    Anion Gap 3 2 - 12 mmol/L    Glucose 80 65 - 100 mg/dL    BUN 40 (H) 6 - 20 MG/DL    Creatinine 2.53 (H) 0.55 - 1.02 MG/DL    BUN/Creatinine Ratio 16 12 - 20      Est, Glom Filt Rate 20 (L) >60 ml/min/1.73m2    Calcium 9.2 8.5 - 10.1 MG/DL    Total Bilirubin 0.7 0.2 - 1.0 MG/DL    ALT 21 12 - 78 U/L    AST 29 15 - 37 U/L    Alk Phosphatase 52 45 - 117 U/L    Total Protein 6.5 6.4 - 8.2 g/dL    Albumin 3.8 3.5 - 5.0 g/dL    Globulin 2.7 2.0 - 4.0 g/dL    Albumin/Globulin Ratio 1.4 1.1 - 2.2     Vascular duplex lower extremity venous bilateral   Result Value Ref Range    Body Surface Area 1.94 m2     All EKGs independently interpreted by me.    RADIOLOGIC STUDIES:   Non x-ray images such as CT, Ultrasound and MRI are read by the radiologist. X-ray images are visualized and preliminarily interpreted by the ED Provider with the findings as listed in the ED Course section below.     Interpretation per the Radiologist is listed below, if available at the time of this note:    Vascular duplex lower extremity venous bilateral   Final Result          SCREENINGS                         ED Course and Assessment/Plan     1:53 PM EDT ED Course, and

## 2025-07-24 ENCOUNTER — HOSPITAL ENCOUNTER (OUTPATIENT)
Facility: HOSPITAL | Age: 65
Discharge: HOME | End: 2025-07-27
Attending: INTERNAL MEDICINE
Payer: MEDICARE

## 2025-07-24 DIAGNOSIS — R11.2 NAUSEA AND VOMITING, UNSPECIFIED VOMITING TYPE: ICD-10-CM

## 2025-07-24 DIAGNOSIS — C90.00 MYELOMA ASSOCIATED AMYLOIDOSIS (HCC): ICD-10-CM

## 2025-07-24 DIAGNOSIS — E85.9 MYELOMA ASSOCIATED AMYLOIDOSIS (HCC): ICD-10-CM

## 2025-07-24 DIAGNOSIS — G89.3 NEOPLASM RELATED PAIN: ICD-10-CM

## 2025-07-24 DIAGNOSIS — G47.00 PERSISTENT DISORDER OF INITIATING OR MAINTAINING SLEEP: ICD-10-CM

## 2025-07-24 DIAGNOSIS — K12.1 ULCERATIVE STOMATITIS: ICD-10-CM

## 2025-07-24 DIAGNOSIS — E55.9 AVITAMINOSIS D: ICD-10-CM

## 2025-07-24 PROCEDURE — 77075 RADEX OSSEOUS SURVEY COMPL: CPT

## (undated) DEVICE — CATHETER IV 18GA L1.16IN OD1.27-1.3462MM ID0.9398-1.016MM

## (undated) DEVICE — CATHETER IV 20GA L1.16IN OD1.0414-1.1176MM ID0.762-0.8382MM

## (undated) DEVICE — SYSTEM REPROC CBL 3 LD DISPOSABLE

## (undated) DEVICE — SET GRAV CK VLV NEEDLESS ST 3 GANGED 4WAY STPCOCK HI FLO 10

## (undated) DEVICE — CATHETER IV 24GA L0.75IN OD0.6604-0.7366MM

## (undated) DEVICE — SINGLE-USE BIOPSY FORCEPS: Brand: RADIAL JAW 4

## (undated) DEVICE — CATHETER IV 22GA L1IN OD0.8382-0.9144MM ID0.6096-0.6858MM 382523

## (undated) DEVICE — IV START KIT: Brand: MEDLINE